# Patient Record
Sex: FEMALE | Race: WHITE
[De-identification: names, ages, dates, MRNs, and addresses within clinical notes are randomized per-mention and may not be internally consistent; named-entity substitution may affect disease eponyms.]

---

## 2017-05-17 ENCOUNTER — HOSPITAL ENCOUNTER (INPATIENT)
Dept: HOSPITAL 23 - P1E | Age: 27
LOS: 5 days | Discharge: HOME | DRG: 897 | End: 2017-05-22
Attending: SPECIALIST | Admitting: SPECIALIST
Payer: COMMERCIAL

## 2017-05-17 DIAGNOSIS — F10.20: Primary | ICD-10-CM

## 2017-05-17 DIAGNOSIS — B19.20: ICD-10-CM

## 2017-05-17 DIAGNOSIS — J45.909: ICD-10-CM

## 2017-05-18 LAB
BARBITURATES UR QL SCN: 0.6 MG/DL (ref 0.2–2)
BARBITURATES UR QL SCN: 3.7 G/DL (ref 3.5–5)
BASOPHIL#: 0 X10E3 (ref 0–0.3)
BASOPHIL%: 0.7 % (ref 0–2.5)
BENZODIAZ UR QL SCN: 120 U/L (ref 10–40)
BENZODIAZ UR QL SCN: 162 U/L (ref 10–42)
BLOOD UREA NITROGEN: 5 MG/DL (ref 9–23)
BUN/CREATININE RATIO: 7.14
BZE UR QL SCN: 127 U/L (ref 32–92)
CALCIUM SERUM: 8.9 MG/DL (ref 8.4–10.2)
CK MB SERPL-RTO: 17.4 % (ref 11–15.5)
CK MB SERPL-RTO: 32.5 G/DL (ref 30–36)
CREATININE SERUM: 0.7 MG/DL (ref 0.6–1.4)
DIFF IND: NO
EOSINOPHIL#: 0.1 X10E3 (ref 0–0.7)
EOSINOPHIL%: 2.1 % (ref 0–7)
GLOM FILT RATE ESTIMATED: 119.6 ML/MIN (ref 60–?)
GLUCOSE FASTING: 96 MG/DL (ref 70–110)
HEMATOCRIT: 35.2 % (ref 35–45)
HEMOGLOBIN: 11.4 GM/DL (ref 12–16)
KETONES UR QL: 28 MMOL/L (ref 22–31)
KETONES UR QL: 97 MMOL/L (ref 100–111)
LYMPHOCYTE#: 1.6 X10E3 (ref 1–3.5)
LYMPHOCYTE%: 28.6 % (ref 17–45)
MEAN CELL VOLUME: 89.6 FL (ref 83–96)
MEAN CORPUSCULAR HEMOGLOBIN: 29.1 PG (ref 28–34)
MEAN PLATELET VOLUME: 7.8 FL (ref 6.5–11.5)
MONOCYTE#: 0.4 X10E3 (ref 0–1)
MONOCYTE%: 6.5 % (ref 3–12)
NEUTROPHIL#: 3.5 X10E3 (ref 1.5–7.1)
NEUTROPHIL%: 62.1 % (ref 40–75)
PLATELET COUNT: 296 X10E3 (ref 140–420)
POTASSIUM: 3.4 MMOL/L (ref 3.5–5.1)
PROTEIN TOTAL SERUM: 6.7 G/DL (ref 6–8.3)
RED BLOOD COUNT: 3.92 X10E (ref 3.9–5.3)
SODIUM: 138 MMOL/L (ref 135–145)
WHITE BLOOD COUNT: 5.6 X10E3 (ref 4–10.5)

## 2017-05-18 PROCEDURE — HZ2ZZZZ DETOXIFICATION SERVICES FOR SUBSTANCE ABUSE TREATMENT: ICD-10-PCS | Performed by: SPECIALIST

## 2017-05-19 LAB
BARBITURATES: (no result)
BENZODIAZEPINES: (no result)
COCAINE: (no result)
DX ICD CODE: (no result)
DX ICD CODE: (no result)
OPIATES: (no result)
TRICYCLIC ANTIDEPRESSANTS: (no result)
U METHADONE: (no result)
URINE APPEARANCE: CLEAR
URINE BILIRUBIN: (no result)
URINE BLOOD: (no result)
URINE COLOR: (no result)
URINE GLUCOSE: (no result) MG/DL
URINE KETONE: (no result)
URINE LEUKOCYTE ESTERASE: (no result)
URINE NITRATE: (no result)
URINE PH: 8 (ref 5–8)
URINE PROTEIN: (no result)
URINE SOURCE: (no result)
URINE SPECIFIC GRAVITY: 1.02 (ref 1–1.03)
URINE UROBILINOGEN: 1 MG/DL

## 2017-05-24 LAB
HBSAG CONF (REFLEX-HEPPANEL): (no result)
HEP C AB SIGNAL TO CUTOFF: 28.4 RATIO (ref ?–1)

## 2019-05-09 ENCOUNTER — LAB REQUISITION (OUTPATIENT)
Dept: LAB | Facility: HOSPITAL | Age: 29
End: 2019-05-09

## 2019-05-09 DIAGNOSIS — Z00.00 ROUTINE GENERAL MEDICAL EXAMINATION AT A HEALTH CARE FACILITY: ICD-10-CM

## 2019-05-09 LAB
ALBUMIN SERPL-MCNC: 3.8 G/DL (ref 3.5–5.2)
ALBUMIN SERPL-MCNC: 3.8 G/DL (ref 3.5–5.2)
ALBUMIN/GLOB SERPL: 1.3 G/DL
ALP SERPL-CCNC: 91 U/L (ref 39–117)
ALP SERPL-CCNC: 91 U/L (ref 39–117)
ALT SERPL W P-5'-P-CCNC: 85 U/L (ref 1–33)
ALT SERPL W P-5'-P-CCNC: 85 U/L (ref 1–33)
AMYLASE SERPL-CCNC: 64 U/L (ref 28–100)
ANION GAP SERPL CALCULATED.3IONS-SCNC: 11 MMOL/L
AST SERPL-CCNC: 49 U/L (ref 1–32)
AST SERPL-CCNC: 49 U/L (ref 1–32)
BASOPHILS # BLD AUTO: 0.02 10*3/MM3 (ref 0–0.2)
BASOPHILS NFR BLD AUTO: 0.3 % (ref 0–1.5)
BILIRUB CONJ SERPL-MCNC: <0.2 MG/DL (ref 0.2–0.3)
BILIRUB INDIRECT SERPL-MCNC: ABNORMAL MG/DL
BILIRUB SERPL-MCNC: 0.2 MG/DL (ref 0.2–1.2)
BILIRUB SERPL-MCNC: 0.2 MG/DL (ref 0.2–1.2)
BUN BLD-MCNC: 11 MG/DL (ref 6–20)
BUN/CREAT SERPL: 17.2 (ref 7–25)
CALCIUM SPEC-SCNC: 9 MG/DL (ref 8.6–10.5)
CHLORIDE SERPL-SCNC: 99 MMOL/L (ref 98–107)
CHOLEST SERPL-MCNC: 165 MG/DL (ref 0–200)
CO2 SERPL-SCNC: 28 MMOL/L (ref 22–29)
CREAT BLD-MCNC: 0.64 MG/DL (ref 0.57–1)
DEPRECATED RDW RBC AUTO: 44.3 FL (ref 37–54)
EOSINOPHIL # BLD AUTO: 0.19 10*3/MM3 (ref 0–0.4)
EOSINOPHIL NFR BLD AUTO: 3 % (ref 0.3–6.2)
ERYTHROCYTE [DISTWIDTH] IN BLOOD BY AUTOMATED COUNT: 12.6 % (ref 12.3–15.4)
GFR SERPL CREATININE-BSD FRML MDRD: 110 ML/MIN/1.73
GFR SERPL CREATININE-BSD FRML MDRD: 134 ML/MIN/1.73
GLOBULIN UR ELPH-MCNC: 2.9 GM/DL
GLUCOSE BLD-MCNC: 123 MG/DL (ref 65–99)
HCT VFR BLD AUTO: 34.6 % (ref 34–46.6)
HDLC SERPL-MCNC: 44 MG/DL (ref 40–60)
HGB BLD-MCNC: 11.8 G/DL (ref 12–15.9)
IMM GRANULOCYTES # BLD AUTO: 0.01 10*3/MM3 (ref 0–0.05)
IMM GRANULOCYTES NFR BLD AUTO: 0.2 % (ref 0–0.5)
LDLC SERPL CALC-MCNC: 83 MG/DL (ref 0–100)
LDLC/HDLC SERPL: 1.89 {RATIO}
LIPASE SERPL-CCNC: 72 U/L (ref 13–60)
LYMPHOCYTES # BLD AUTO: 2.99 10*3/MM3 (ref 0.7–3.1)
LYMPHOCYTES NFR BLD AUTO: 47.5 % (ref 19.6–45.3)
MCH RBC QN AUTO: 32.4 PG (ref 26.6–33)
MCHC RBC AUTO-ENTMCNC: 34.1 G/DL (ref 31.5–35.7)
MCV RBC AUTO: 95.1 FL (ref 79–97)
MONOCYTES # BLD AUTO: 0.33 10*3/MM3 (ref 0.1–0.9)
MONOCYTES NFR BLD AUTO: 5.2 % (ref 5–12)
NEUTROPHILS # BLD AUTO: 2.76 10*3/MM3 (ref 1.7–7)
NEUTROPHILS NFR BLD AUTO: 43.8 % (ref 42.7–76)
PLATELET # BLD AUTO: 317 10*3/MM3 (ref 140–450)
PMV BLD AUTO: 9.1 FL (ref 6–12)
POTASSIUM BLD-SCNC: 4.1 MMOL/L (ref 3.5–5.2)
PROT SERPL-MCNC: 6.7 G/DL (ref 6–8.5)
PROT SERPL-MCNC: 6.7 G/DL (ref 6–8.5)
RBC # BLD AUTO: 3.64 10*6/MM3 (ref 3.77–5.28)
SODIUM BLD-SCNC: 138 MMOL/L (ref 136–145)
TRIGL SERPL-MCNC: 190 MG/DL (ref 0–150)
TSH SERPL DL<=0.05 MIU/L-ACNC: 1.52 MIU/ML (ref 0.27–4.2)
VLDLC SERPL-MCNC: 38 MG/DL
WBC NRBC COR # BLD: 6.3 10*3/MM3 (ref 3.4–10.8)

## 2019-05-09 PROCEDURE — 80076 HEPATIC FUNCTION PANEL: CPT

## 2019-05-09 PROCEDURE — 83690 ASSAY OF LIPASE: CPT

## 2019-05-09 PROCEDURE — 82150 ASSAY OF AMYLASE: CPT

## 2019-05-09 PROCEDURE — 85025 COMPLETE CBC W/AUTO DIFF WBC: CPT

## 2019-05-09 PROCEDURE — 84443 ASSAY THYROID STIM HORMONE: CPT

## 2019-05-09 PROCEDURE — 80061 LIPID PANEL: CPT

## 2019-05-09 PROCEDURE — 80053 COMPREHEN METABOLIC PANEL: CPT

## 2022-11-30 ENCOUNTER — APPOINTMENT (OUTPATIENT)
Dept: GENERAL RADIOLOGY | Facility: HOSPITAL | Age: 32
End: 2022-11-30

## 2022-11-30 ENCOUNTER — HOSPITAL ENCOUNTER (EMERGENCY)
Facility: HOSPITAL | Age: 32
Discharge: HOME | End: 2022-11-30
Attending: EMERGENCY MEDICINE | Admitting: EMERGENCY MEDICINE

## 2022-11-30 VITALS
DIASTOLIC BLOOD PRESSURE: 62 MMHG | TEMPERATURE: 97.7 F | SYSTOLIC BLOOD PRESSURE: 111 MMHG | RESPIRATION RATE: 18 BRPM | OXYGEN SATURATION: 99 % | HEART RATE: 63 BPM

## 2022-11-30 DIAGNOSIS — S63.257A CLOSED DISLOCATION OF LEFT LITTLE FINGER: Primary | ICD-10-CM

## 2022-11-30 PROCEDURE — 73140 X-RAY EXAM OF FINGER(S): CPT

## 2022-11-30 PROCEDURE — 99283 EMERGENCY DEPT VISIT LOW MDM: CPT

## 2022-11-30 RX ORDER — IBUPROFEN 800 MG/1
800 TABLET ORAL EVERY 8 HOURS PRN
Qty: 20 TABLET | Refills: 0 | OUTPATIENT
Start: 2022-11-30 | End: 2022-12-14

## 2022-11-30 RX ORDER — IBUPROFEN 400 MG/1
800 TABLET ORAL ONCE
Status: COMPLETED | OUTPATIENT
Start: 2022-11-30 | End: 2022-11-30

## 2022-11-30 RX ORDER — HYDROXYZINE PAMOATE 50 MG/1
CAPSULE ORAL
COMMUNITY

## 2022-11-30 RX ORDER — BUSPIRONE HYDROCHLORIDE 15 MG/1
15 TABLET ORAL 3 TIMES DAILY
COMMUNITY

## 2022-11-30 RX ADMIN — IBUPROFEN 800 MG: 400 TABLET, FILM COATED ORAL at 07:54

## 2022-12-14 ENCOUNTER — HOSPITAL ENCOUNTER (EMERGENCY)
Facility: HOSPITAL | Age: 32
Discharge: HOME | End: 2022-12-14
Attending: EMERGENCY MEDICINE | Admitting: EMERGENCY MEDICINE

## 2022-12-14 VITALS
OXYGEN SATURATION: 96 % | RESPIRATION RATE: 16 BRPM | BODY MASS INDEX: 28.6 KG/M2 | SYSTOLIC BLOOD PRESSURE: 101 MMHG | HEIGHT: 64 IN | WEIGHT: 167.55 LBS | HEART RATE: 72 BPM | TEMPERATURE: 98.3 F | DIASTOLIC BLOOD PRESSURE: 68 MMHG

## 2022-12-14 DIAGNOSIS — B34.9 VIRAL ILLNESS: ICD-10-CM

## 2022-12-14 DIAGNOSIS — M79.10 MYALGIA: ICD-10-CM

## 2022-12-14 DIAGNOSIS — J02.9 SORE THROAT: ICD-10-CM

## 2022-12-14 DIAGNOSIS — Z20.828 EXPOSURE TO THE FLU: Primary | ICD-10-CM

## 2022-12-14 LAB
FLUAV AG NPH QL: NEGATIVE
FLUBV AG NPH QL IA: NEGATIVE
S PYO AG THROAT QL: NEGATIVE
SARS-COV-2 RNA PNL SPEC NAA+PROBE: NOT DETECTED

## 2022-12-14 PROCEDURE — 87880 STREP A ASSAY W/OPTIC: CPT | Performed by: EMERGENCY MEDICINE

## 2022-12-14 PROCEDURE — U0004 COV-19 TEST NON-CDC HGH THRU: HCPCS | Performed by: NURSE PRACTITIONER

## 2022-12-14 PROCEDURE — 99283 EMERGENCY DEPT VISIT LOW MDM: CPT

## 2022-12-14 PROCEDURE — C9803 HOPD COVID-19 SPEC COLLECT: HCPCS | Performed by: NURSE PRACTITIONER

## 2022-12-14 PROCEDURE — 87804 INFLUENZA ASSAY W/OPTIC: CPT | Performed by: NURSE PRACTITIONER

## 2022-12-14 PROCEDURE — 87081 CULTURE SCREEN ONLY: CPT | Performed by: EMERGENCY MEDICINE

## 2022-12-14 RX ORDER — ACETAMINOPHEN 500 MG
1000 TABLET ORAL ONCE
Status: COMPLETED | OUTPATIENT
Start: 2022-12-14 | End: 2022-12-14

## 2022-12-14 RX ORDER — IBUPROFEN 800 MG/1
800 TABLET ORAL EVERY 8 HOURS PRN
Qty: 30 TABLET | Refills: 0 | Status: SHIPPED | OUTPATIENT
Start: 2022-12-14

## 2022-12-14 RX ORDER — ACETAMINOPHEN 500 MG
1000 TABLET ORAL EVERY 6 HOURS PRN
Qty: 30 TABLET | Refills: 0 | Status: SHIPPED | OUTPATIENT
Start: 2022-12-14

## 2022-12-14 RX ORDER — IBUPROFEN 600 MG/1
600 TABLET ORAL ONCE
Status: COMPLETED | OUTPATIENT
Start: 2022-12-14 | End: 2022-12-14

## 2022-12-14 RX ADMIN — IBUPROFEN 600 MG: 600 TABLET, FILM COATED ORAL at 09:46

## 2022-12-14 RX ADMIN — ACETAMINOPHEN 1000 MG: 500 TABLET ORAL at 09:46

## 2022-12-14 NOTE — DISCHARGE INSTRUCTIONS
Your flu and strep test were both negative today.  Your COVID test is pending.  I recommend isolating until you get your COVID test back.  You can check your MyChart for this.  Someone may call you with a positive result once received.  Alternate Tylenol and ibuprofen every 4 hours as needed for any pain or discomfort or fever.  Please drink plenty of fluids and stay well-hydrated.  Rest.  You can try gargling warm salt water for sore throat as well.  Follow-up with your family doctor in 2 to 3 days if you feel like you are not improving or return to the emergency department with any new or worsening symptoms.

## 2022-12-14 NOTE — ED PROVIDER NOTES
Subjective   History of Present Illness  This is a 32-year-old female who presents to the emergency department today with complaints of body aches, sore throat, chills, runny nose and dry cough.  She reports that she is currently at recovery center and has been exposed to other people who have the flu currently.  Symptoms began yesterday.  She denies any chest pain or shortness of breath.  She states she is not had any Tylenol or ibuprofen and states she does not have anywhere she is currently staying.  She denies any abdominal pain, nausea, vomiting or diarrhea.        Review of Systems   Constitutional: Positive for activity change, appetite change, chills, fatigue and fever.   HENT: Positive for postnasal drip, rhinorrhea and sore throat. Negative for congestion, ear pain, facial swelling and trouble swallowing.    Eyes: Negative.    Respiratory: Positive for cough. Negative for shortness of breath.    Cardiovascular: Negative for chest pain.   Gastrointestinal: Negative for abdominal pain, diarrhea, nausea and vomiting.   Endocrine: Negative.    Genitourinary: Negative for decreased urine volume, dysuria, flank pain and urgency.   Musculoskeletal: Positive for myalgias. Negative for arthralgias, neck pain and neck stiffness.   Skin: Negative for color change, rash and wound.   Allergic/Immunologic: Negative.    Neurological: Negative for dizziness, syncope, weakness, light-headedness and headaches.   Hematological: Negative.    Psychiatric/Behavioral: Negative.        Past Medical History:   Diagnosis Date   • Alcohol abuse    • Depression    • Substance abuse (HCC)        No Known Allergies    Past Surgical History:   Procedure Laterality Date   •  SECTION     • LEG SURGERY         History reviewed. No pertinent family history.    Social History     Socioeconomic History   • Marital status: Single   Tobacco Use   • Smoking status: Some Days     Types: Cigarettes   Substance and Sexual Activity   •  Alcohol use: Not Currently   • Drug use: Not Currently           Objective   Physical Exam  Constitutional:       General: She is not in acute distress.     Appearance: She is well-developed. She is ill-appearing. She is not toxic-appearing or diaphoretic.   HENT:      Head: Normocephalic and atraumatic.      Right Ear: Tympanic membrane normal. Tympanic membrane is not erythematous.      Left Ear: Tympanic membrane normal. Tympanic membrane is not erythematous.      Nose: Rhinorrhea present. No congestion.      Mouth/Throat:      Mouth: Mucous membranes are moist. No oral lesions.      Pharynx: Posterior oropharyngeal erythema present. No pharyngeal swelling, oropharyngeal exudate or uvula swelling.      Tonsils: No tonsillar exudate.   Eyes:      Conjunctiva/sclera: Conjunctivae normal.      Pupils: Pupils are equal, round, and reactive to light.   Cardiovascular:      Rate and Rhythm: Normal rate and regular rhythm.      Heart sounds: Normal heart sounds.   Pulmonary:      Effort: Pulmonary effort is normal. No respiratory distress.      Breath sounds: Normal breath sounds.   Abdominal:      General: Bowel sounds are normal.      Palpations: Abdomen is soft.      Tenderness: There is no abdominal tenderness.   Musculoskeletal:      Cervical back: Normal range of motion and neck supple.   Lymphadenopathy:      Cervical: No cervical adenopathy.   Skin:     General: Skin is warm and dry.      Capillary Refill: Capillary refill takes less than 2 seconds.   Neurological:      General: No focal deficit present.      Mental Status: She is alert and oriented to person, place, and time.   Psychiatric:         Mood and Affect: Mood normal.         Behavior: Behavior normal.         Procedures           ED Course                                           MDM  Number of Diagnoses or Management Options  Exposure to the flu  Myalgia  Sore throat  Viral illness  Diagnosis management comments:     Symptom Relief for Viral  Illnesses       1. DIAGNOSIS      Exposure to flu  Viral sore throat    You have been diagnosed with an illness caused by a virus.  Antibiotics do not work on viruses.  When antibiotics aren't needed, they won't help you, and the side effects could still hurt you.  The treatments prescribed below will help you feel better while your body fights off the virus.     2. GENERAL INSTRUCTIONS  Drink extra water and fluids  Use a cool mist vaporizer or saline nasal spray to relieve congestion  For sore throats in older children and adults, use ice chips, sore throat spray, or lozenges  Use honey to relieve cough.  Do not give honey to an infant younger than 1 year.       3. SPECIFIC MEDICINES   Use over-the-counter medications specific to your symptoms. Use medicines according to the package instructions or as directed by your healthcare professional.  Stop the medication when the symptoms get better.      4. FOLLOW UP  If not improved in 3-5 days, if new symptoms occur, or if you have other concerns, please call or return to the office for a recheck.        To learn more about antibiotic prescribing and use, visit www.cdc.gov/antibiotic-use    The patient presents to the ED with a cough. The patient is resting comfortably and feels better, is alert and in no distress.  On re-examination the patient does not appear toxic and has no meningeal signs (including a negative Kernig and Brudzinski sign), and there's no intractable vomiting, respiratory distress and no apparent pain. Based on the history, exam, diagnostic testing and reassessment, the patient has no signs of meningitis, significant pneumonia, pyelonephritis, sepsis or other acute serious bacterial infections, or other significant pathology to warrant further testing, continued ED treatment, admission or specialist evaluation. The patient's vital signs have been stable. The patient's condition is stable and is appropriate for discharge. The patient´s symptoms are  consistent with a viral upper respiratory infection and antibiotics are not indicated. The patient was counseled to return to the ED for re-evaluation for worsening cough, shortness of breath, uncontrollable headache, uncontrollable fever, altered mental status, or any symptoms which cause them concern. The patient will pursue further outpatient evaluation with the primary care physician or other designated or consultant physician as indicated in the discharge instructions.       Amount and/or Complexity of Data Reviewed  Clinical lab tests: ordered and reviewed  Tests in the medicine section of CPT®: ordered and reviewed  Review and summarize past medical records: yes    Risk of Complications, Morbidity, and/or Mortality  Presenting problems: moderate  Diagnostic procedures: low  Management options: moderate    Patient Progress  Patient progress: stable      Final diagnoses:   Exposure to the flu   Viral illness   Sore throat   Myalgia       ED Disposition  ED Disposition     ED Disposition   Discharge    Condition   Stable    Comment   --             Provider, No Known  [facility] [place] [address]    Schedule an appointment as soon as possible for a visit in 2 days  If symptoms worsen, As needed    [facility] EMERGENCY ROOM  [address] [facility] [id number] [phone]  Go to   As needed, If symptoms worsen         Medication List      New Prescriptions    acetaminophen 500 MG tablet  Commonly known as: TYLENOL  Take 2 tablets by mouth Every 6 (Six) Hours As Needed for Mild Pain.        Changed    ibuprofen 800 MG tablet  Commonly known as: ADVIL,MOTRIN  Take 1 tablet by mouth Every 8 (Eight) Hours As Needed for Mild Pain, Moderate Pain, Fever or Headache.  What changed: reasons to take this           Where to Get Your Medications      These medications were sent to [facility] - [address] - [phone] [facility] [phone] [facility]   [address], [place] [address]    Phone: [phone]   · acetaminophen 500 MG tablet  · ibuprofen 800 MG tablet          Megan Wilkinson, APRN  12/14/22 1020

## 2022-12-16 LAB — BACTERIA SPEC AEROBE CULT: NORMAL

## 2023-05-03 ENCOUNTER — APPOINTMENT (OUTPATIENT)
Dept: CT IMAGING | Facility: HOSPITAL | Age: 33
End: 2023-05-03
Payer: COMMERCIAL

## 2023-05-03 ENCOUNTER — HOSPITAL ENCOUNTER (EMERGENCY)
Facility: HOSPITAL | Age: 33
Discharge: HOME | End: 2023-05-04
Attending: EMERGENCY MEDICINE
Payer: COMMERCIAL

## 2023-05-03 ENCOUNTER — APPOINTMENT (OUTPATIENT)
Dept: GENERAL RADIOLOGY | Facility: HOSPITAL | Age: 33
End: 2023-05-03
Payer: COMMERCIAL

## 2023-05-03 VITALS
HEIGHT: 64 IN | HEART RATE: 73 BPM | SYSTOLIC BLOOD PRESSURE: 98 MMHG | WEIGHT: 137 LBS | DIASTOLIC BLOOD PRESSURE: 38 MMHG | OXYGEN SATURATION: 98 % | TEMPERATURE: 98 F | BODY MASS INDEX: 23.39 KG/M2 | RESPIRATION RATE: 16 BRPM

## 2023-05-03 DIAGNOSIS — K59.00 CONSTIPATION, UNSPECIFIED CONSTIPATION TYPE: Primary | ICD-10-CM

## 2023-05-03 DIAGNOSIS — R11.0 NAUSEA: ICD-10-CM

## 2023-05-03 DIAGNOSIS — R10.84 GENERALIZED ABDOMINAL PAIN: ICD-10-CM

## 2023-05-03 LAB
ALBUMIN SERPL-MCNC: 3.3 G/DL (ref 3.5–5.2)
ALBUMIN/GLOB SERPL: 1.3 G/DL
ALP SERPL-CCNC: 77 U/L (ref 39–117)
ALT SERPL W P-5'-P-CCNC: 113 U/L (ref 1–33)
ANION GAP SERPL CALCULATED.3IONS-SCNC: 5 MMOL/L (ref 5–15)
AST SERPL-CCNC: 121 U/L (ref 1–32)
B-HCG UR QL: NEGATIVE
BACTERIA UR QL AUTO: NORMAL /HPF
BASOPHILS # BLD AUTO: 0.02 10*3/MM3 (ref 0–0.2)
BASOPHILS NFR BLD AUTO: 0.5 % (ref 0–1.5)
BILIRUB SERPL-MCNC: <0.2 MG/DL (ref 0–1.2)
BILIRUB UR QL STRIP: NEGATIVE
BUN SERPL-MCNC: 9 MG/DL (ref 6–20)
BUN/CREAT SERPL: 10.1 (ref 7–25)
CALCIUM SPEC-SCNC: 8.6 MG/DL (ref 8.6–10.5)
CHLORIDE SERPL-SCNC: 105 MMOL/L (ref 98–107)
CLARITY UR: CLEAR
CO2 SERPL-SCNC: 28 MMOL/L (ref 22–29)
COLOR UR: YELLOW
CREAT SERPL-MCNC: 0.89 MG/DL (ref 0.57–1)
DEPRECATED RDW RBC AUTO: 42.5 FL (ref 37–54)
EGFRCR SERPLBLD CKD-EPI 2021: 88.5 ML/MIN/1.73
EOSINOPHIL # BLD AUTO: 0.29 10*3/MM3 (ref 0–0.4)
EOSINOPHIL NFR BLD AUTO: 7.1 % (ref 0.3–6.2)
ERYTHROCYTE [DISTWIDTH] IN BLOOD BY AUTOMATED COUNT: 12.2 % (ref 12.3–15.4)
GLOBULIN UR ELPH-MCNC: 2.5 GM/DL
GLUCOSE SERPL-MCNC: 110 MG/DL (ref 65–99)
GLUCOSE UR STRIP-MCNC: NEGATIVE MG/DL
HCT VFR BLD AUTO: 37.9 % (ref 34–46.6)
HGB BLD-MCNC: 12.2 G/DL (ref 12–15.9)
HGB UR QL STRIP.AUTO: NEGATIVE
HYALINE CASTS UR QL AUTO: NORMAL /LPF
IMM GRANULOCYTES # BLD AUTO: 0.01 10*3/MM3 (ref 0–0.05)
IMM GRANULOCYTES NFR BLD AUTO: 0.2 % (ref 0–0.5)
KETONES UR QL STRIP: NEGATIVE
LEUKOCYTE ESTERASE UR QL STRIP.AUTO: ABNORMAL
LIPASE SERPL-CCNC: 17 U/L (ref 13–60)
LYMPHOCYTES # BLD AUTO: 1.87 10*3/MM3 (ref 0.7–3.1)
LYMPHOCYTES NFR BLD AUTO: 45.8 % (ref 19.6–45.3)
MCH RBC QN AUTO: 30.3 PG (ref 26.6–33)
MCHC RBC AUTO-ENTMCNC: 32.2 G/DL (ref 31.5–35.7)
MCV RBC AUTO: 94 FL (ref 79–97)
MONOCYTES # BLD AUTO: 0.39 10*3/MM3 (ref 0.1–0.9)
MONOCYTES NFR BLD AUTO: 9.6 % (ref 5–12)
NEUTROPHILS NFR BLD AUTO: 1.5 10*3/MM3 (ref 1.7–7)
NEUTROPHILS NFR BLD AUTO: 36.8 % (ref 42.7–76)
NITRITE UR QL STRIP: NEGATIVE
NRBC BLD AUTO-RTO: 0 /100 WBC (ref 0–0.2)
PH UR STRIP.AUTO: 7 [PH] (ref 5–8)
PLATELET # BLD AUTO: 228 10*3/MM3 (ref 140–450)
PMV BLD AUTO: 9.4 FL (ref 6–12)
POTASSIUM SERPL-SCNC: 4.5 MMOL/L (ref 3.5–5.2)
PROT SERPL-MCNC: 5.8 G/DL (ref 6–8.5)
PROT UR QL STRIP: NEGATIVE
RBC # BLD AUTO: 4.03 10*6/MM3 (ref 3.77–5.28)
RBC # UR STRIP: NORMAL /HPF
REF LAB TEST METHOD: NORMAL
SODIUM SERPL-SCNC: 138 MMOL/L (ref 136–145)
SP GR UR STRIP: 1.01 (ref 1–1.03)
SQUAMOUS #/AREA URNS HPF: NORMAL /HPF
UROBILINOGEN UR QL STRIP: ABNORMAL
WBC # UR STRIP: NORMAL /HPF
WBC NRBC COR # BLD: 4.08 10*3/MM3 (ref 3.4–10.8)

## 2023-05-03 PROCEDURE — 85025 COMPLETE CBC W/AUTO DIFF WBC: CPT | Performed by: EMERGENCY MEDICINE

## 2023-05-03 PROCEDURE — 87591 N.GONORRHOEAE DNA AMP PROB: CPT | Performed by: EMERGENCY MEDICINE

## 2023-05-03 PROCEDURE — 71045 X-RAY EXAM CHEST 1 VIEW: CPT

## 2023-05-03 PROCEDURE — 74176 CT ABD & PELVIS W/O CONTRAST: CPT

## 2023-05-03 PROCEDURE — 80053 COMPREHEN METABOLIC PANEL: CPT | Performed by: EMERGENCY MEDICINE

## 2023-05-03 PROCEDURE — 93005 ELECTROCARDIOGRAM TRACING: CPT | Performed by: EMERGENCY MEDICINE

## 2023-05-03 PROCEDURE — 99284 EMERGENCY DEPT VISIT MOD MDM: CPT

## 2023-05-03 PROCEDURE — 83690 ASSAY OF LIPASE: CPT | Performed by: EMERGENCY MEDICINE

## 2023-05-03 PROCEDURE — 36415 COLL VENOUS BLD VENIPUNCTURE: CPT

## 2023-05-03 PROCEDURE — 81025 URINE PREGNANCY TEST: CPT | Performed by: EMERGENCY MEDICINE

## 2023-05-03 PROCEDURE — 87491 CHLMYD TRACH DNA AMP PROBE: CPT | Performed by: EMERGENCY MEDICINE

## 2023-05-03 PROCEDURE — 81001 URINALYSIS AUTO W/SCOPE: CPT | Performed by: EMERGENCY MEDICINE

## 2023-05-03 RX ORDER — ONDANSETRON 2 MG/ML
4 INJECTION INTRAMUSCULAR; INTRAVENOUS ONCE
Status: COMPLETED | OUTPATIENT
Start: 2023-05-03 | End: 2023-05-04

## 2023-05-03 RX ORDER — MAGNESIUM CARB/ALUMINUM HYDROX 105-160MG
296 TABLET,CHEWABLE ORAL ONCE
Status: DISCONTINUED | OUTPATIENT
Start: 2023-05-03 | End: 2023-05-03 | Stop reason: RX

## 2023-05-03 RX ORDER — POLYETHYLENE GLYCOL 3350 17 G/17G
17 POWDER, FOR SOLUTION ORAL DAILY
Qty: 10 EACH | Refills: 0 | Status: ON HOLD | OUTPATIENT
Start: 2023-05-03 | End: 2023-05-11

## 2023-05-03 RX ORDER — AMOXICILLIN 250 MG
2 CAPSULE ORAL NIGHTLY PRN
Qty: 60 TABLET | Refills: 0 | Status: ON HOLD | OUTPATIENT
Start: 2023-05-03 | End: 2023-05-11

## 2023-05-03 RX ORDER — ONDANSETRON 4 MG/1
4 TABLET, ORALLY DISINTEGRATING ORAL EVERY 6 HOURS PRN
Qty: 12 TABLET | Refills: 0 | Status: ON HOLD | OUTPATIENT
Start: 2023-05-03 | End: 2023-05-11

## 2023-05-04 PROCEDURE — 25010000002 ONDANSETRON PER 1 MG: Performed by: EMERGENCY MEDICINE

## 2023-05-04 PROCEDURE — 96374 THER/PROPH/DIAG INJ IV PUSH: CPT

## 2023-05-04 RX ORDER — MAGNESIUM HYDROXIDE 1200 MG/15ML
1 LIQUID ORAL
Qty: 4 ENEMA | Refills: 0 | Status: ON HOLD | OUTPATIENT
Start: 2023-05-04 | End: 2023-05-11

## 2023-05-04 RX ADMIN — MAGNESIUM HYDROXIDE 30 ML: 400 SUSPENSION ORAL at 00:16

## 2023-05-04 RX ADMIN — ONDANSETRON 4 MG: 2 INJECTION INTRAMUSCULAR; INTRAVENOUS at 00:16

## 2023-05-04 NOTE — ED PROVIDER NOTES
Subjective   History of Present Illness  32-year-old female is brought by EMS from the [facility] where she is undergoing treatment for alcoholism.  She is brought for evaluation of a low blood pressure prior to arrival.  The patient reports that the lowest blood pressure prior to arrival was in the 70s systolic over 50s diastolic.  The patient also complains of generalized abdominal pain and constipation with last bowel movement approximately 6 days ago.  She has some associated nausea but denies recent vomiting.  She has a similar prior episode of severe constipation with no bowel movement for 12 days in the past which required significant laxative use to resolved.  Patient reports she has to strain to urinate and has some generalized weakness and low back pain as well.  Patient reports history of hepatitis C, and requests STD testing.  She denies acute urinary symptoms such as dysuria or urinary incontinence recently.  She reports a history of pancreatitis.            Past Medical History:   Diagnosis Date   • Alcohol abuse    • Depression    • Substance abuse    Hepatitis C, pancreatitis    No Known Allergies    Past Surgical History:   Procedure Laterality Date   •  SECTION     • LEG SURGERY         History reviewed. No pertinent family history.    Social History     Socioeconomic History   • Marital status: Single   Tobacco Use   • Smoking status: Some Days     Types: Cigarettes   Substance and Sexual Activity   • Alcohol use: Not Currently   • Drug use: Not Currently     History of alcoholism.  Patient reports history of IV drug abuse many years ago.  She denies any recent IV drug use.      Objective   Physical Exam  Vitals and nursing note reviewed.   Constitutional:       General: She is not in acute distress.     Appearance: She is not diaphoretic.   Eyes:      General: No scleral icterus.     Comments: No photophobia.   Neck:      Comments: No meningismus.  Cardiovascular:      Rate and Rhythm: Normal  rate and regular rhythm.      Heart sounds: No murmur heard.    No friction rub. No gallop.      Comments: S1, S2.  Pulmonary:      Effort: Pulmonary effort is normal.      Breath sounds: Normal breath sounds. No stridor. No wheezing, rhonchi or rales.   Abdominal:      Palpations: Abdomen is soft.      Tenderness: There is no right CVA tenderness or left CVA tenderness.      Comments: Mild distention, mild diffuse tenderness to palpation.  No guarding or rebound.   Musculoskeletal:         General: No deformity.      Cervical back: Neck supple.      Comments: No significant peripheral edema.   Skin:     General: Skin is warm and dry.      Coloration: Skin is not cyanotic.      Comments: No diaphoresis.   Neurological:      Comments: Normal speech, no dysarthria.  Moves all extremities.         Procedures           ED Course  ED Course as of 05/04/23 0011   Wed May 03, 2023   2352 Prior to discharge, results and plan were discussed with the patient.  Patient is going back to the [facility] for further inpatient detox and treatment.  Prescriptions were printed out and given to staff member of the [facility].  All questions were answered prior to discharge.  Her mild elevation in liver function test is likely secondary to her history of known hepatitis C. [LD]      ED Course User Index  [LD] Kimberly Isbell MD                                           Medical Decision Making  Differential diagnosis includes constipation, IBS with constipation, less likely bowel obstruction or urinary tract infection.  Differential diagnosis includes electrolyte abnormality.    Constipation, unspecified constipation type: complicated acute illness or injury  Generalized abdominal pain: complicated acute illness or injury  Nausea: complicated acute illness or injury  Amount and/or Complexity of Data Reviewed  Labs: ordered. Decision-making details documented in ED Course.  Radiology: ordered. Decision-making details documented in ED  Course.  ECG/medicine tests: ordered and independent interpretation performed. Decision-making details documented in ED Course.     Details: EKG at 2001 shows normal sinus rhythm at a rate of 85 bpm, normal intervals, no acute ischemic changes.      Risk  OTC drugs.  Prescription drug management.        Recent Results (from the past 24 hour(s))   Urinalysis With Microscopic If Indicated (No Culture) - Urine, Clean Catch    Collection Time: 05/03/23  7:57 PM    Specimen: Urine, Clean Catch   Result Value Ref Range    Color, UA Yellow Yellow, Straw    Appearance, UA Clear Clear    pH, UA 7.0 5.0 - 8.0    Specific Gravity, UA 1.008 1.001 - 1.030    Glucose, UA Negative Negative    Ketones, UA Negative Negative    Bilirubin, UA Negative Negative    Blood, UA Negative Negative    Protein, UA Negative Negative    Leuk Esterase, UA Trace (A) Negative    Nitrite, UA Negative Negative    Urobilinogen, UA 0.2 E.U./dL 0.2 - 1.0 E.U./dL   Pregnancy, Urine - Urine, Clean Catch    Collection Time: 05/03/23  7:57 PM    Specimen: Urine, Clean Catch   Result Value Ref Range    HCG, Urine QL Negative Negative   Urinalysis, Microscopic Only - Urine, Clean Catch    Collection Time: 05/03/23  7:57 PM    Specimen: Urine, Clean Catch   Result Value Ref Range    RBC, UA None Seen None Seen, 0-2 /HPF    WBC, UA 0-2 None Seen, 0-2 /HPF    Bacteria, UA Trace None Seen, Trace /HPF    Squamous Epithelial Cells, UA 0-2 None Seen, 0-2 /HPF    Hyaline Casts, UA None Seen 0 - 6 /LPF    Methodology Automated Microscopy    ECG 12 Lead Altered Mental Status    Collection Time: 05/03/23  8:01 PM   Result Value Ref Range    QT Interval 384 ms    QTC Interval 456 ms   Comprehensive Metabolic Panel    Collection Time: 05/03/23  8:07 PM    Specimen: Blood   Result Value Ref Range    Glucose 110 (H) 65 - 99 mg/dL    BUN 9 6 - 20 mg/dL    Creatinine 0.89 0.57 - 1.00 mg/dL    Sodium 138 136 - 145 mmol/L    Potassium 4.5 3.5 - 5.2 mmol/L    Chloride 105 98 -  107 mmol/L    CO2 28.0 22.0 - 29.0 mmol/L    Calcium 8.6 8.6 - 10.5 mg/dL    Total Protein 5.8 (L) 6.0 - 8.5 g/dL    Albumin 3.3 (L) 3.5 - 5.2 g/dL    ALT (SGPT) 113 (H) 1 - 33 U/L    AST (SGOT) 121 (H) 1 - 32 U/L    Alkaline Phosphatase 77 39 - 117 U/L    Total Bilirubin <0.2 0.0 - 1.2 mg/dL    Globulin 2.5 gm/dL    A/G Ratio 1.3 g/dL    BUN/Creatinine Ratio 10.1 7.0 - 25.0    Anion Gap 5.0 5.0 - 15.0 mmol/L    eGFR 88.5 >60.0 mL/min/1.73   CBC Auto Differential    Collection Time: 05/03/23  8:07 PM    Specimen: Blood   Result Value Ref Range    WBC 4.08 3.40 - 10.80 10*3/mm3    RBC 4.03 3.77 - 5.28 10*6/mm3    Hemoglobin 12.2 12.0 - 15.9 g/dL    Hematocrit 37.9 34.0 - 46.6 %    MCV 94.0 79.0 - 97.0 fL    MCH 30.3 26.6 - 33.0 pg    MCHC 32.2 31.5 - 35.7 g/dL    RDW 12.2 (L) 12.3 - 15.4 %    RDW-SD 42.5 37.0 - 54.0 fl    MPV 9.4 6.0 - 12.0 fL    Platelets 228 140 - 450 10*3/mm3    Neutrophil % 36.8 (L) 42.7 - 76.0 %    Lymphocyte % 45.8 (H) 19.6 - 45.3 %    Monocyte % 9.6 5.0 - 12.0 %    Eosinophil % 7.1 (H) 0.3 - 6.2 %    Basophil % 0.5 0.0 - 1.5 %    Immature Grans % 0.2 0.0 - 0.5 %    Neutrophils, Absolute 1.50 (L) 1.70 - 7.00 10*3/mm3    Lymphocytes, Absolute 1.87 0.70 - 3.10 10*3/mm3    Monocytes, Absolute 0.39 0.10 - 0.90 10*3/mm3    Eosinophils, Absolute 0.29 0.00 - 0.40 10*3/mm3    Basophils, Absolute 0.02 0.00 - 0.20 10*3/mm3    Immature Grans, Absolute 0.01 0.00 - 0.05 10*3/mm3    nRBC 0.0 0.0 - 0.2 /100 WBC   Lipase    Collection Time: 05/03/23  8:07 PM    Specimen: Blood   Result Value Ref Range    Lipase 17 13 - 60 U/L     Note: In addition to lab results from this visit, the labs listed above may include labs taken at another facility or during a different encounter within the last 24 hours. Please correlate lab times with ED admission and discharge times for further clarification of the services performed during this visit.    CT Abdomen Pelvis Without Contrast   Final Result   Impression:      1.  "Marked fecal stasis, but with no evidence of inflammation or other associated abnormality except for redundant descending colon.      2. Significantly distended stomach, perhaps due to a large recent meal. Delayed gastric emptying is a consideration. No evidence of small bowel dilatation or obstruction.      3. No evidence of urolithiasis, obstructive uropathy, or perinephric inflammatory change. No other evidence of acute intra-abdominal or intrapelvic disease.                  Electronically Signed: Zeke Parikhd     5/3/2023 10:41 PM EDT     Workstation ID: [id number]      XR Chest 1 View   Final Result   No acute cardiopulmonary abnormality.               Electronically Signed: Paz Carlton     5/3/2023 8:24 PM EDT     Workstation ID: [id number]        Vitals:    05/03/23 1845 05/03/23 2130 05/03/23 2257   BP: 112/70  (!) 98/38   Patient Position: Lying     Pulse: 81 74 73   Resp: 16     Temp: 98 °F (36.7 °C)     TempSrc: Oral     SpO2: 97% 94% 98%   Weight: 62.1 kg (137 lb)     Height: 162.6 cm (64\")       Medications   ondansetron (ZOFRAN) injection 4 mg (has no administration in time range)   magnesium hydroxide (MILK OF MAGNESIA) 400 MG/5ML suspension 30 mL (has no administration in time range)     ECG/EMG Results (last 24 hours)     Procedure Component Value Units Date/Time    ECG 12 Lead Altered Mental Status [[id number]] Collected: 05/03/23 2001     Updated: 05/03/23 2001     QT Interval 384 ms      QTC Interval 456 ms     Narrative:      Test Reason : Altered Mental Status  Blood Pressure :   */*   mmHG  Vent. Rate :  85 BPM     Atrial Rate :  85 BPM     P-R Int : 174 ms          QRS Dur :  82 ms      QT Int : 384 ms       P-R-T Axes :  44  13  49 degrees     QTc Int : 456 ms    ** Poor data quality, interpretation may be adversely affected  Normal sinus rhythm  Normal ECG  No previous ECGs available    Referred By: ED MD           Confirmed By:         ECG 12 Lead Altered Mental Status   Preliminary Result   Test " Reason : Altered Mental Status   Blood Pressure :   */*   mmHG   Vent. Rate :  85 BPM     Atrial Rate :  85 BPM      P-R Int : 174 ms          QRS Dur :  82 ms       QT Int : 384 ms       P-R-T Axes :  44  13  49 degrees      QTc Int : 456 ms      ** Poor data quality, interpretation may be adversely affected   Normal sinus rhythm   Normal ECG   No previous ECGs available      Referred By: ED MD           Confirmed By:               Final diagnoses:   Constipation, unspecified constipation type   Generalized abdominal pain   Nausea       ED Disposition  ED Disposition     ED Disposition   Discharge    Condition   Stable    Comment   --             PATIENT CONNECTION - [place] [address]  [phone]  Schedule an appointment as soon as possible for a visit in 1 week  Call to establish care with a primary care provider.    Erasmo Thomas MD  [address] [place] [address]  [phone]    Schedule an appointment as soon as possible for a visit in 1 week  This is a gastroenterologist or you can follow up with your gastroenterologist of choice for further evaluation and treatment of your constipation. There are several other maintainance treatments you may benefit from to keep this from happening again.         Medication List      New Prescriptions    magnesium hydroxide 400 MG/5ML suspension  Commonly known as: Milk of Magnesia  Take 30 mL by mouth Daily As Needed for Constipation.     ondansetron ODT 4 MG disintegrating tablet  Commonly known as: ZOFRAN-ODT  Place 1 tablet under the tongue Every 6 (Six) Hours As Needed for Nausea or Vomiting.     polyethylene glycol 17 g packet  Commonly known as: MIRALAX  Take 17 g by mouth Daily for 10 days.     sennosides-docusate 8.6-50 MG per tablet  Commonly known as: Senna S  Take 2 tablets by mouth At Night As Needed for Constipation for up to 30 days. PRN constipation     sodium phosphate 7-19 GM/118ML enema  Insert 1 enema into  the rectum Every Other Day As Needed (constipation).           Where to Get Your Medications      You can get these medications from any pharmacy    Bring a paper prescription for each of these medications  · magnesium hydroxide 400 MG/5ML suspension  · ondansetron ODT 4 MG disintegrating tablet  · polyethylene glycol 17 g packet  · sennosides-docusate 8.6-50 MG per tablet  · sodium phosphate 7-19 GM/118ML enema          Kimberly Isbell MD  05/03/23 4650       Kimberly Isbell MD  05/04/23 0011

## 2023-05-05 LAB
C TRACH RRNA SPEC QL NAA+PROBE: NEGATIVE
N GONORRHOEA RRNA SPEC QL NAA+PROBE: NEGATIVE

## 2023-05-07 LAB
QT INTERVAL: 384 MS
QTC INTERVAL: 456 MS

## 2023-05-11 ENCOUNTER — PREP FOR SURGERY (OUTPATIENT)
Dept: OTHER | Facility: HOSPITAL | Age: 33
End: 2023-05-11
Payer: COMMERCIAL

## 2023-05-11 ENCOUNTER — HOSPITAL ENCOUNTER (INPATIENT)
Facility: HOSPITAL | Age: 33
LOS: 6 days | Discharge: HOME | DRG: 885 | End: 2023-05-17
Attending: PSYCHIATRY & NEUROLOGY | Admitting: PSYCHIATRY & NEUROLOGY
Payer: COMMERCIAL

## 2023-05-11 ENCOUNTER — HOSPITAL ENCOUNTER (EMERGENCY)
Facility: HOSPITAL | Age: 33
Discharge: PSYCHIATRIC OR SUBSTANCE USE FACILITY | DRG: 885 | End: 2023-05-11
Attending: EMERGENCY MEDICINE
Payer: COMMERCIAL

## 2023-05-11 VITALS
HEIGHT: 64 IN | DIASTOLIC BLOOD PRESSURE: 54 MMHG | TEMPERATURE: 97.5 F | BODY MASS INDEX: 23.67 KG/M2 | RESPIRATION RATE: 20 BRPM | OXYGEN SATURATION: 99 % | HEART RATE: 118 BPM | SYSTOLIC BLOOD PRESSURE: 108 MMHG | WEIGHT: 138.67 LBS

## 2023-05-11 DIAGNOSIS — F29 PSYCHOSIS: Primary | ICD-10-CM

## 2023-05-11 DIAGNOSIS — F29 PSYCHOSIS: ICD-10-CM

## 2023-05-11 DIAGNOSIS — F99 PSYCHIATRIC DISORDER: ICD-10-CM

## 2023-05-11 DIAGNOSIS — F19.10 SUBSTANCE ABUSE: ICD-10-CM

## 2023-05-11 DIAGNOSIS — F99 PSYCHIATRIC COMPLAINT: Primary | ICD-10-CM

## 2023-05-11 LAB
ALBUMIN SERPL-MCNC: 4.3 G/DL (ref 3.5–5.2)
ALBUMIN/GLOB SERPL: 1.3 G/DL
ALP SERPL-CCNC: 86 U/L (ref 39–117)
ALT SERPL W P-5'-P-CCNC: 65 U/L (ref 1–33)
AMPHET+METHAMPHET UR QL: POSITIVE
ANION GAP SERPL CALCULATED.3IONS-SCNC: 12.5 MMOL/L (ref 5–15)
APAP SERPL-MCNC: <5 MCG/ML (ref 0–30)
AST SERPL-CCNC: 57 U/L (ref 1–32)
BACTERIA UR QL AUTO: ABNORMAL /HPF
BARBITURATES UR QL SCN: NEGATIVE
BASOPHILS # BLD AUTO: 0.03 10*3/MM3 (ref 0–0.2)
BASOPHILS NFR BLD AUTO: 0.4 % (ref 0–1.5)
BENZODIAZ UR QL SCN: POSITIVE
BILIRUB SERPL-MCNC: 0.5 MG/DL (ref 0–1.2)
BILIRUB UR QL STRIP: NEGATIVE
BUN SERPL-MCNC: 17 MG/DL (ref 6–20)
BUN/CREAT SERPL: 18.3 (ref 7–25)
CALCIUM SPEC-SCNC: 9.4 MG/DL (ref 8.6–10.5)
CANNABINOIDS SERPL QL: POSITIVE
CHLORIDE SERPL-SCNC: 96 MMOL/L (ref 98–107)
CLARITY UR: CLEAR
CO2 SERPL-SCNC: 28.5 MMOL/L (ref 22–29)
COCAINE UR QL: NEGATIVE
COLOR UR: ABNORMAL
CREAT SERPL-MCNC: 0.93 MG/DL (ref 0.57–1)
DEPRECATED RDW RBC AUTO: 39.7 FL (ref 37–54)
EGFRCR SERPLBLD CKD-EPI 2021: 83.9 ML/MIN/1.73
EOSINOPHIL # BLD AUTO: 0.65 10*3/MM3 (ref 0–0.4)
EOSINOPHIL NFR BLD AUTO: 8.9 % (ref 0.3–6.2)
ERYTHROCYTE [DISTWIDTH] IN BLOOD BY AUTOMATED COUNT: 12.2 % (ref 12.3–15.4)
ETHANOL BLD-MCNC: <10 MG/DL (ref 0–10)
ETHANOL UR QL: <0.01 %
FENTANYL UR-MCNC: NEGATIVE NG/ML
GLOBULIN UR ELPH-MCNC: 3.2 GM/DL
GLUCOSE SERPL-MCNC: 124 MG/DL (ref 65–99)
GLUCOSE UR STRIP-MCNC: NEGATIVE MG/DL
HCT VFR BLD AUTO: 41.1 % (ref 34–46.6)
HGB BLD-MCNC: 13.9 G/DL (ref 12–15.9)
HGB UR QL STRIP.AUTO: NEGATIVE
HOLD SPECIMEN: NORMAL
HOLD SPECIMEN: NORMAL
HYALINE CASTS UR QL AUTO: ABNORMAL /LPF
IMM GRANULOCYTES # BLD AUTO: 0.01 10*3/MM3 (ref 0–0.05)
IMM GRANULOCYTES NFR BLD AUTO: 0.1 % (ref 0–0.5)
KETONES UR QL STRIP: NEGATIVE
LEUKOCYTE ESTERASE UR QL STRIP.AUTO: ABNORMAL
LYMPHOCYTES # BLD AUTO: 2.43 10*3/MM3 (ref 0.7–3.1)
LYMPHOCYTES NFR BLD AUTO: 33.2 % (ref 19.6–45.3)
MCH RBC QN AUTO: 30.3 PG (ref 26.6–33)
MCHC RBC AUTO-ENTMCNC: 33.8 G/DL (ref 31.5–35.7)
MCV RBC AUTO: 89.7 FL (ref 79–97)
METHADONE UR QL SCN: NEGATIVE
MONOCYTES # BLD AUTO: 0.57 10*3/MM3 (ref 0.1–0.9)
MONOCYTES NFR BLD AUTO: 7.8 % (ref 5–12)
NEUTROPHILS NFR BLD AUTO: 3.63 10*3/MM3 (ref 1.7–7)
NEUTROPHILS NFR BLD AUTO: 49.6 % (ref 42.7–76)
NITRITE UR QL STRIP: NEGATIVE
NRBC BLD AUTO-RTO: 0 /100 WBC (ref 0–0.2)
OPIATES UR QL: NEGATIVE
OXYCODONE UR QL SCN: NEGATIVE
PH UR STRIP.AUTO: 6 [PH] (ref 5–8)
PLATELET # BLD AUTO: 371 10*3/MM3 (ref 140–450)
PMV BLD AUTO: 8.9 FL (ref 6–12)
POTASSIUM SERPL-SCNC: 3.1 MMOL/L (ref 3.5–5.2)
PROT SERPL-MCNC: 7.5 G/DL (ref 6–8.5)
PROT UR QL STRIP: NEGATIVE
RBC # BLD AUTO: 4.58 10*6/MM3 (ref 3.77–5.28)
RBC # UR STRIP: ABNORMAL /HPF
REF LAB TEST METHOD: ABNORMAL
SALICYLATES SERPL-MCNC: <0.3 MG/DL
SODIUM SERPL-SCNC: 137 MMOL/L (ref 136–145)
SP GR UR STRIP: 1.03 (ref 1–1.03)
SQUAMOUS #/AREA URNS HPF: ABNORMAL /HPF
UROBILINOGEN UR QL STRIP: ABNORMAL
WBC # UR STRIP: ABNORMAL /HPF
WBC NRBC COR # BLD: 7.32 10*3/MM3 (ref 3.4–10.8)
WHOLE BLOOD HOLD COAG: NORMAL
WHOLE BLOOD HOLD SPECIMEN: NORMAL

## 2023-05-11 PROCEDURE — 80307 DRUG TEST PRSMV CHEM ANLYZR: CPT | Performed by: EMERGENCY MEDICINE

## 2023-05-11 PROCEDURE — 82077 ASSAY SPEC XCP UR&BREATH IA: CPT

## 2023-05-11 PROCEDURE — 85025 COMPLETE CBC W/AUTO DIFF WBC: CPT

## 2023-05-11 PROCEDURE — 81001 URINALYSIS AUTO W/SCOPE: CPT

## 2023-05-11 PROCEDURE — 80053 COMPREHEN METABOLIC PANEL: CPT

## 2023-05-11 PROCEDURE — 99283 EMERGENCY DEPT VISIT LOW MDM: CPT

## 2023-05-11 PROCEDURE — 80143 DRUG ASSAY ACETAMINOPHEN: CPT

## 2023-05-11 PROCEDURE — 80179 DRUG ASSAY SALICYLATE: CPT

## 2023-05-11 PROCEDURE — 36415 COLL VENOUS BLD VENIPUNCTURE: CPT

## 2023-05-11 RX ORDER — LOPERAMIDE HYDROCHLORIDE 2 MG/1
2 CAPSULE ORAL
Status: CANCELLED | OUTPATIENT
Start: 2023-05-11

## 2023-05-11 RX ORDER — DIPHENHYDRAMINE HCL 50 MG
50 CAPSULE ORAL EVERY 4 HOURS PRN
Status: CANCELLED | OUTPATIENT
Start: 2023-05-11

## 2023-05-11 RX ORDER — DULOXETIN HYDROCHLORIDE 30 MG/1
1 CAPSULE, DELAYED RELEASE ORAL DAILY
Status: ON HOLD | COMMUNITY
Start: 2023-04-26 | End: 2023-05-17 | Stop reason: SDUPTHER

## 2023-05-11 RX ORDER — LORAZEPAM 2 MG/1
2 TABLET ORAL EVERY 4 HOURS PRN
Status: DISCONTINUED | OUTPATIENT
Start: 2023-05-11 | End: 2023-05-17 | Stop reason: HOSPADM

## 2023-05-11 RX ORDER — CYCLOBENZAPRINE HCL 10 MG
1 TABLET ORAL EVERY 12 HOURS SCHEDULED
COMMUNITY
Start: 2023-04-26 | End: 2023-05-17 | Stop reason: HOSPADM

## 2023-05-11 RX ORDER — HALOPERIDOL 5 MG/1
5 TABLET ORAL EVERY 4 HOURS PRN
Status: CANCELLED | OUTPATIENT
Start: 2023-05-11

## 2023-05-11 RX ORDER — OLANZAPINE 10 MG/1
20 TABLET ORAL ONCE
Status: COMPLETED | OUTPATIENT
Start: 2023-05-11 | End: 2023-05-11

## 2023-05-11 RX ORDER — NICOTINE 21 MG/24HR
1 PATCH, TRANSDERMAL 24 HOURS TRANSDERMAL DAILY PRN
Status: CANCELLED | OUTPATIENT
Start: 2023-05-11

## 2023-05-11 RX ORDER — BUPRENORPHINE AND NALOXONE 8; 2 MG/1; MG/1
2 FILM, SOLUBLE BUCCAL; SUBLINGUAL DAILY
COMMUNITY
Start: 2023-05-05

## 2023-05-11 RX ORDER — PROMETHAZINE HYDROCHLORIDE 25 MG/1
1 TABLET ORAL EVERY 6 HOURS PRN
COMMUNITY
Start: 2023-03-24 | End: 2023-05-17 | Stop reason: HOSPADM

## 2023-05-11 RX ORDER — TRAZODONE HYDROCHLORIDE 50 MG/1
100 TABLET ORAL NIGHTLY PRN
Status: DISCONTINUED | OUTPATIENT
Start: 2023-05-11 | End: 2023-05-17 | Stop reason: HOSPADM

## 2023-05-11 RX ORDER — DIPHENHYDRAMINE HYDROCHLORIDE 50 MG/ML
50 INJECTION INTRAMUSCULAR; INTRAVENOUS EVERY 4 HOURS PRN
Status: DISCONTINUED | OUTPATIENT
Start: 2023-05-11 | End: 2023-05-17 | Stop reason: HOSPADM

## 2023-05-11 RX ORDER — ACETAMINOPHEN 325 MG/1
650 TABLET ORAL EVERY 4 HOURS PRN
Status: CANCELLED | OUTPATIENT
Start: 2023-05-11

## 2023-05-11 RX ORDER — LORAZEPAM 2 MG/ML
2 INJECTION INTRAMUSCULAR EVERY 4 HOURS PRN
Status: CANCELLED | OUTPATIENT
Start: 2023-05-11 | End: 2023-05-18

## 2023-05-11 RX ORDER — DIPHENHYDRAMINE HYDROCHLORIDE 50 MG/ML
50 INJECTION INTRAMUSCULAR; INTRAVENOUS EVERY 4 HOURS PRN
Status: CANCELLED | OUTPATIENT
Start: 2023-05-11

## 2023-05-11 RX ORDER — HALOPERIDOL 5 MG/1
5 TABLET ORAL EVERY 4 HOURS PRN
Status: DISCONTINUED | OUTPATIENT
Start: 2023-05-11 | End: 2023-05-17 | Stop reason: HOSPADM

## 2023-05-11 RX ORDER — OLANZAPINE 10 MG/1
20 TABLET ORAL ONCE
Status: CANCELLED | OUTPATIENT
Start: 2023-05-11

## 2023-05-11 RX ORDER — LOPERAMIDE HYDROCHLORIDE 2 MG/1
2 CAPSULE ORAL
Status: DISCONTINUED | OUTPATIENT
Start: 2023-05-11 | End: 2023-05-17 | Stop reason: HOSPADM

## 2023-05-11 RX ORDER — LORAZEPAM 2 MG/1
2 TABLET ORAL EVERY 4 HOURS PRN
Status: CANCELLED | OUTPATIENT
Start: 2023-05-11 | End: 2023-05-18

## 2023-05-11 RX ORDER — LORAZEPAM 2 MG/ML
2 INJECTION INTRAMUSCULAR EVERY 4 HOURS PRN
Status: DISCONTINUED | OUTPATIENT
Start: 2023-05-11 | End: 2023-05-17 | Stop reason: HOSPADM

## 2023-05-11 RX ORDER — DIPHENHYDRAMINE HCL 50 MG
50 CAPSULE ORAL EVERY 4 HOURS PRN
Status: DISCONTINUED | OUTPATIENT
Start: 2023-05-11 | End: 2023-05-17 | Stop reason: HOSPADM

## 2023-05-11 RX ORDER — PROPRANOLOL HYDROCHLORIDE 10 MG/1
1 TABLET ORAL 3 TIMES DAILY
COMMUNITY
Start: 2023-04-26 | End: 2023-05-17 | Stop reason: HOSPADM

## 2023-05-11 RX ORDER — HYDROXYZINE PAMOATE 50 MG/1
50 CAPSULE ORAL EVERY 6 HOURS PRN
Status: CANCELLED | OUTPATIENT
Start: 2023-05-11

## 2023-05-11 RX ORDER — TRAZODONE HYDROCHLORIDE 50 MG/1
50 TABLET ORAL NIGHTLY PRN
COMMUNITY
Start: 2023-05-05 | End: 2023-05-17 | Stop reason: HOSPADM

## 2023-05-11 RX ORDER — HALOPERIDOL 5 MG/ML
5 INJECTION INTRAMUSCULAR EVERY 4 HOURS PRN
Status: DISCONTINUED | OUTPATIENT
Start: 2023-05-11 | End: 2023-05-17 | Stop reason: HOSPADM

## 2023-05-11 RX ORDER — ACETAMINOPHEN 325 MG/1
650 TABLET ORAL EVERY 4 HOURS PRN
Status: DISCONTINUED | OUTPATIENT
Start: 2023-05-11 | End: 2023-05-17 | Stop reason: HOSPADM

## 2023-05-11 RX ORDER — ALUMINA, MAGNESIA, AND SIMETHICONE 2400; 2400; 240 MG/30ML; MG/30ML; MG/30ML
15 SUSPENSION ORAL EVERY 6 HOURS PRN
Status: DISCONTINUED | OUTPATIENT
Start: 2023-05-11 | End: 2023-05-17 | Stop reason: HOSPADM

## 2023-05-11 RX ORDER — OLANZAPINE 10 MG/1
20 TABLET ORAL NIGHTLY
Status: CANCELLED | OUTPATIENT
Start: 2023-05-11

## 2023-05-11 RX ORDER — LAMOTRIGINE 25 MG/1
1 TABLET ORAL EVERY 12 HOURS SCHEDULED
COMMUNITY
Start: 2023-04-26 | End: 2023-05-17 | Stop reason: HOSPADM

## 2023-05-11 RX ORDER — ALUMINA, MAGNESIA, AND SIMETHICONE 2400; 2400; 240 MG/30ML; MG/30ML; MG/30ML
15 SUSPENSION ORAL EVERY 6 HOURS PRN
Status: CANCELLED | OUTPATIENT
Start: 2023-05-11

## 2023-05-11 RX ORDER — HALOPERIDOL 5 MG/ML
5 INJECTION INTRAMUSCULAR EVERY 4 HOURS PRN
Status: CANCELLED | OUTPATIENT
Start: 2023-05-11

## 2023-05-11 RX ORDER — HYDROXYZINE PAMOATE 50 MG/1
50 CAPSULE ORAL EVERY 6 HOURS PRN
Status: DISCONTINUED | OUTPATIENT
Start: 2023-05-11 | End: 2023-05-17 | Stop reason: HOSPADM

## 2023-05-11 RX ORDER — SODIUM CHLORIDE 0.9 % (FLUSH) 0.9 %
10 SYRINGE (ML) INJECTION AS NEEDED
Status: DISCONTINUED | OUTPATIENT
Start: 2023-05-11 | End: 2023-05-11 | Stop reason: HOSPADM

## 2023-05-11 RX ORDER — NICOTINE 21 MG/24HR
1 PATCH, TRANSDERMAL 24 HOURS TRANSDERMAL DAILY PRN
Status: DISCONTINUED | OUTPATIENT
Start: 2023-05-11 | End: 2023-05-17 | Stop reason: HOSPADM

## 2023-05-11 RX ORDER — OLANZAPINE 10 MG/1
20 TABLET ORAL NIGHTLY
Status: DISCONTINUED | OUTPATIENT
Start: 2023-05-11 | End: 2023-05-13

## 2023-05-11 RX ORDER — TRAZODONE HYDROCHLORIDE 100 MG/1
100 TABLET ORAL NIGHTLY PRN
Status: CANCELLED | OUTPATIENT
Start: 2023-05-11

## 2023-05-11 RX ADMIN — NICOTINE 1 PATCH: 21 PATCH, EXTENDED RELEASE TRANSDERMAL at 13:53

## 2023-05-11 RX ADMIN — OLANZAPINE 20 MG: 10 TABLET, FILM COATED ORAL at 22:24

## 2023-05-11 RX ADMIN — OLANZAPINE 20 MG: 10 TABLET, FILM COATED ORAL at 13:11
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
## 2023-05-11 NOTE — PLAN OF CARE
Goal Outcome Evaluation:  Plan of Care Reviewed With: patient  Patient Agreement with Plan of Care: agrees           (SEE ADMISSION NOTE 05/11/23)

## 2023-05-11 NOTE — SIGNIFICANT NOTE
05/11/23 1038   Behavior WDL   Behavior WDL interactions;motor movement;X   Interactions guarded;responding to internal stimuli;suspicious   Motor Movement notable mannerisms/gestures   Emotion Mood WDL   Emotion/Mood/Affect WDL affect;emotion mood   Affect tearful;blunted   Emotion/Mood euphoric;expansive;anxious   Speech WDL   Speech WDL speech;X   Speech pressured speech;rambling;hyper verbal   Perceptual State WDL   Perceptual State WDL hallucinations;perceptual state;X   Hallucinations other (see comments)  (Unable to determine)   Perceptual State illusions   Thought Process WDL   Thought Process WDL delusions;judgment and insight;thought content;thought process   Delusions religiosity;somatic   Judgment and Insight insight not appropriate to situation;judgment not appropriate to situation   Thought Content suicidal thoughts;somatic concerns;preoccupation   Thought Process flight of ideas   Intellectual Performance WDL   Intellectual Performance WDL intellectual performance;level of consciousness;WDL   Intellectual Performance able to comprehend   Level of Consciousness Alert   Coping/Stress   Major Change/Loss/Stressor legal concerns;chemical dependency/abuse   Patient Personal Strengths self-reliant   Sources of Support unable to assess   Techniques to [place] with Loss/Stress/Change substance use   Reaction to Health Status overwhelmed   C-SSRS (Recent)   Q1 Wished to be Dead (Past Month) no   Q2 Suicidal Thoughts (Past Month) yes   Q3 Suicidal Thought Method no   Q4 Suicidal Intent without Specific Plan no   Q5 Suicide Intent with Specific Plan no   Q6 Suicide Behavior (Lifetime) no   Violence Risk   Feels Like Hurting Others no   Previous Attempt to Harm Others no     SW met with pt at bedside this date at the request of the provider. Pt was initially calm, however, once SW began inquiring about pts concerns pt became agitated and tearful. Pt was throwing her arms around and shouting things that SW could  "not understand. Pt reported that she was \"suicidal\" because of other people and that it was not her fault. Pt was rambling about mental illness and it was not her fault that everyone was mentally ill. Pt had [employer] papers in her hand and she crumbled them. Pt was stating that she wanted all the blood tests to be run on her for HIV, cancer, STDs and every disease. SW was unable to hear all of what pt was stating during her assessment because pt appeared extremely manic with pressured speech. SW updated provider regarding pts presentation during her assessment this date.   "

## 2023-05-12 PROBLEM — F33.2 SEVERE RECURRENT MAJOR DEPRESSION WITHOUT PSYCHOTIC FEATURES: Status: ACTIVE | Noted: 2023-05-12

## 2023-05-12 PROBLEM — F13.10 BENZODIAZEPINE ABUSE: Status: ACTIVE | Noted: 2023-05-12

## 2023-05-12 PROBLEM — Z72.0 TOBACCO ABUSE: Status: ACTIVE | Noted: 2023-05-12

## 2023-05-12 PROBLEM — F12.10 MARIJUANA ABUSE: Status: ACTIVE | Noted: 2023-05-12

## 2023-05-12 PROBLEM — F15.10 AMPHETAMINE ABUSE: Status: ACTIVE | Noted: 2023-05-12

## 2023-05-12 RX ADMIN — HYDROXYZINE PAMOATE 50 MG: 50 CAPSULE ORAL at 05:37

## 2023-05-12 RX ADMIN — OLANZAPINE 20 MG: 10 TABLET, FILM COATED ORAL at 21:28

## 2023-05-12 RX ADMIN — ACETAMINOPHEN 650 MG: 325 TABLET ORAL at 19:43
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
## 2023-05-12 NOTE — PLAN OF CARE
Goal Outcome Evaluation:  Plan of Care Reviewed With: patient  Patient Agreement with Plan of Care: agrees                Pt is able to be roused with verbal stimulation and light touch.  Pt is alert to self and place.  Pt denies SI and HI.  Pt denies a/v/h.  Pt has been withdrawn to bed today.  Pt exhibits no s/s of acute distress.

## 2023-05-12 NOTE — PLAN OF CARE
Goal Outcome Evaluation:  Plan of Care Reviewed With: patient  Patient Agreement with Plan of Care: agrees     Pt has stayed in bed this shift except a couple times of briefly getting up. CWA at bedside. Once up and med compliant after education. The second time, close to the end of shift the pt jumped up and came towards CWA rambling with words. This nurse went in and spoke with the patient and pt disclosed she had a nightmare and requested prazosin. This medication was not ordered, educated given. Pt was able to take the PRN dose of Vistaril. Pt rated anxiety 7, denies SI, HI, AVH, and Depression.

## 2023-05-13 RX ORDER — OLANZAPINE 10 MG/1
10 TABLET ORAL NIGHTLY
Status: DISCONTINUED | OUTPATIENT
Start: 2023-05-13 | End: 2023-05-17 | Stop reason: HOSPADM

## 2023-05-13 RX ADMIN — NICOTINE 1 PATCH: 21 PATCH, EXTENDED RELEASE TRANSDERMAL at 20:56

## 2023-05-13 RX ADMIN — HYDROXYZINE PAMOATE 50 MG: 50 CAPSULE ORAL at 01:15

## 2023-05-13 RX ADMIN — OLANZAPINE 10 MG: 10 TABLET, FILM COATED ORAL at 20:52

## 2023-05-13 NOTE — PLAN OF CARE
Goal Outcome Evaluation:  Plan of Care Reviewed With: patient  Patient Agreement with Plan of Care: agrees   PATIENT ALERT AND ORIENTED AND CALM AND COOPERATIVE WITH STAFF. PATIENT DENIES S/I, H/I AND REPORTS VOICES ARE IMPROVING. PATIENT CONTINUES TO STAY WITHDRAWN TO HER ROOM. NO INAPPROPRIATE OR AGGRESSIVE BEHAVIOR NOTED . WILL CONTINUE TO MONITOR FOR CHANGES IN MOOD OR BEHAVIOR.

## 2023-05-13 NOTE — PLAN OF CARE
Goal Outcome Evaluation:  Plan of Care Reviewed With: patient  Patient Agreement with Plan of Care: agrees   Pt withdrawn to room, avoids social interaction. Polite upon approach. Complained of back pain, medication administered and relieved. Provider notified of BP 96/54 on second attempt, no new orders for night med dose. Pt denies SI, HI, AVH. Rated depression 7 and anxiety 7.

## 2023-05-13 NOTE — PROGRESS NOTES
[facility]     Psychiatric Progress Note    Patient Name: Gerry German  : 1990  MRN: [id number]  Primary Care Physician:  Provider, No Known  Date of admission: 2023    Subjective   Subjective     Patient seen and chart reviewed, discussed with staff.    Chief Complaint: Psychosis, depression, substance abuse      HPI:     Staff reports the patient rated her anxiety and depression as a 7.  Continues to endorse hallucinations but not as severe.  Denies suicidal homicidal ideation.  Noted to be withdrawn to her room.    Patient's morning is in her bed sleeping.  She does arouse for interview but is difficult to engage.  She gives minimal responses.  She continues to mumble and have very poor diction.  Patient complains of being sleepy and appears somewhat tired and a little sedated this morning.  She denies any suicidal or homicidal ideation.  She reports that she did not sleep well but this was not the case according to staff.  Patient reports that she had some nightmares last night.  Asked about being on prazosin.  Given that she already has low blood pressure would not provide at this time.  We will reduce dose of olanzapine.      Objective   Objective     Vitals:   Temp:  [97.5 °F (36.4 °C)-97.9 °F (36.6 °C)] 97.5 °F (36.4 °C)  Heart Rate:  [56-85] 56  Resp:  [16-18] 16  BP: ()/(57-59) 102/59          Mental Status Exam:      Appearance:   Sleeping, arousable, but dozes off frequently  Reliability:   Limited  Eye Contact:   Poor  Concentration/Focus:    Attentive but minimally  Behaviors:    No restlessness or agitation, withdrawal and isolate, drowsy  Memory :    Intact  Speech:    Minimal, mumbling, poor diction  Language:   Appropriate  Mood :    Dysthymic  Affect:    Constricted  Thought process:    Guarded, goal directed  Thought Content:    Denies suicidal or homicidal ideation, reports hallucinations  Insight:   Fair  Judgement:    Intact, no behavioral disturbance      Result  Review    Result Review:  I have personally reviewed the results from the time of this admission to 5/13/2023 12:20 EDT and agree with these findings:  []  Laboratory  []  Microbiology  []  Radiology  []  EKG/Telemetry   []  Cardiology/Vascular   []  Pathology  []  Old records  []  Other:  Most notable findings include:     Lab Results (last 24 hours)     ** No results found for the last 24 hours. **              Medications:   OLANZapine, 10 mg, Oral, Nightly          Assessment / Plan       Active Hospital Problems:  Active Hospital Problems    Diagnosis    • **Psychosis    • Amphetamine abuse    • Benzodiazepine abuse    • Marijuana abuse    • Tobacco abuse    • Severe recurrent major depression without psychotic features        Plan:     • Decreased olanzapine to 10 mg secondary to sedation  • [place] mood stabilization abatement of psychosis referral to drug and alcohol rehabilitation  • Work on mood stabilization and abatement of any suicidal ideation or psychosis.  Work on appropriate safety plan  • Continue supportive therapy  • Patient to engage in all group and individual treatment modalities available on the unit  • Obtain collateral information if possible  • Titrate medications as clinically indicated  • Work on appropriate disposition follow-up including referrals to substance abuse treatment if indicated      Disposition:  I expect patient to be discharged 2 to 3 days.    Part of this note may be an electronic transcription/translation of spoken language to printed text using the Dragon dictation system.         Electronically signed by Hunter Bridges MD, 05/13/23, 12:20 PM EDT.

## 2023-05-14 RX ORDER — BUPRENORPHINE HYDROCHLORIDE AND NALOXONE HYDROCHLORIDE DIHYDRATE 8; 2 MG/1; MG/1
2 TABLET SUBLINGUAL DAILY
Status: DISCONTINUED | OUTPATIENT
Start: 2023-05-14 | End: 2023-05-17 | Stop reason: HOSPADM

## 2023-05-14 RX ORDER — DULOXETIN HYDROCHLORIDE 30 MG/1
30 CAPSULE, DELAYED RELEASE ORAL DAILY
Status: DISCONTINUED | OUTPATIENT
Start: 2023-05-14 | End: 2023-05-17 | Stop reason: HOSPADM

## 2023-05-14 RX ADMIN — ACETAMINOPHEN 650 MG: 325 TABLET ORAL at 09:49

## 2023-05-14 RX ADMIN — DULOXETINE HYDROCHLORIDE 30 MG: 30 CAPSULE, DELAYED RELEASE ORAL at 13:14

## 2023-05-14 RX ADMIN — OLANZAPINE 10 MG: 10 TABLET, FILM COATED ORAL at 20:02

## 2023-05-14 RX ADMIN — NICOTINE 1 PATCH: 21 PATCH, EXTENDED RELEASE TRANSDERMAL at 20:03

## 2023-05-14 RX ADMIN — BUPRENORPHINE AND NALOXONE 2 TABLET: 8; 2 TABLET SUBLINGUAL at 11:05

## 2023-05-14 NOTE — PLAN OF CARE
"Goal Outcome Evaluation:  Plan of Care Reviewed With: patient  Patient Agreement with Plan of Care: agrees      Pt has underling irritability, apologizes to staff for short answers. Pt stated, \"I am withdrawaling from suboxone and they won't give me any. All I want to do is sleep, just let me do that.\" Pt told of friend Martin calling and stating he would possibly visit tomorrow. Pt stated that he didn't have to do that. Friend Martin stated to this nurse that he believes that the pt sleeps to escape daily life and relive the past with her kids in dreams since she doesn't have much to do with the two kids. One is living with her mother and the other with the child's father and no one trusts her with them. Pt denies SI, HI, AVH at this time. Pt stated that her anxiety was a 6, depression was a \"12\"            "

## 2023-05-14 NOTE — PLAN OF CARE
Goal Outcome Evaluation:  Plan of Care Reviewed With: patient  Patient Agreement with Plan of Care: agrees   PATIENT ALERT AND ORIENTED AND COOPERATIVE WITH STAFF. COMPLIANT WITH MEDICATIONS. PATIENT DENIES S/I, H/I OR HALLUCINATIONS AT THIS TIME. PATIENT STARTED ON SUBOXONE TODAY AND MOOD HAS IMPROVED SINCE TAKING IT . PATIENT HAS BEEN OUT OF ROOM MORE TODAY INTERACTING WITH STAFF AND PEERS.

## 2023-05-14 NOTE — PROGRESS NOTES
" [facility]     Psychiatric Progress Note    Patient Name: Gerry German  : 1990  MRN: [id number]  Primary Care Physician:  Provider, No Known  Date of admission: 2023    Subjective   Subjective     Patient seen and chart reviewed, discussed with staff.    Chief Complaint: Psychosis, depression, substance use      HPI:     Patient today is much more alert and awake and engaging in interview than previous days.  Patient also states that she is withdrawing hard from Suboxone.  Patient reports that her last dose was the day prior to coming to the hospital.  Patient reports that usually on day 4-5 the withdrawal becomes almost unbearable.  She reports that she is having back pain and leg pain.  Appears to have some diaphoresis.  Has some agitation and anxiety.  Denies any suicidal or homicidal ideation.  Patient states that she has been taking Suboxone and says, \"it is on my [facility] you can check, I am detoxing and I need it.\"    More alert and awake with reduction of olanzapine.    States that she needed to be started back on medications including duloxetine.    Staff reports she continues to be withdrawn and isolative to her room.  She been compliant with treatment medications.  Reported her anxiety as a 6 and depression as a 10 staff this morning.  He is denying suicidal or homicidal ideation as well as any hallucinations        Objective   Objective     Vitals:   Temp:  [97.5 °F (36.4 °C)-98.3 °F (36.8 °C)] 97.5 °F (36.4 °C)  Heart Rate:  [] 116  Resp:  [16] 16  BP: (108-125)/(84-85) 125/85          Mental Status Exam:      Appearance:   Well-developed, well-nourished, appears in some discomfort due to withdrawal from Suboxone, much more awake and talkative than any previous day  Reliability:   Good  Eye Contact:   Good  Concentration/Focus:    Attentive  Behaviors:    Little restlessness, no agitation  Memory :    Intact  Speech:    Normal rate and volume, spontaneous  Language:   " "Appropriate, relevant  Mood :    \"Better\"  Affect:    Irritable, slightly anxious  Thought process:    Sequential, goal directed, no paranoia or delusions  Thought Content:    Denies suicidal or homicidal ideation, denies hallucinations and none evident  Insight:   Fair  Judgement:    Intact, no behavioral disturbance      Result Review    Result Review:  I have personally reviewed the results from the time of this admission to 5/14/2023 12:57 EDT and agree with these findings:  []  Laboratory  []  Microbiology  []  Radiology  []  EKG/Telemetry   []  Cardiology/Vascular   []  Pathology  []  Old records  []  Other:  Most notable findings include:     Lab Results (last 24 hours)     ** No results found for the last 24 hours. **              Medications:   buprenorphine-naloxone, 2 tablet, Sublingual, Daily  OLANZapine, 10 mg, Oral, Nightly          Assessment / Plan       Active Hospital Problems:  Active Hospital Problems    Diagnosis    • **Psychosis    • Amphetamine abuse    • Benzodiazepine abuse    • Marijuana abuse    • Tobacco abuse    • Severe recurrent major depression without psychotic features        Plan:     • Continue low-dose olanzapine  • Add duloxetine for depression as patient reports that she has been on it in came into the hospital to get back on her antidepressants.  • Patient has been getting Suboxone 16 mg daily and got a day prior to admission and will continue therapy and start medication.  • Work on mood stabilization and abatement of any suicidal ideation or psychosis.  Work on appropriate safety plan  • Continue supportive therapy  • Patient to engage in all group and individual treatment modalities available on the unit  • Obtain collateral information if possible  • Titrate medications as clinically indicated  • Work on appropriate disposition follow-up including referrals to substance abuse treatment if indicated      Disposition:  I expect patient to be discharged 1 to 2 days.    Part " of this note may be an electronic transcription/translation of spoken language to printed text using the Dragon dictation system.         Electronically signed by Hunter Bridges MD, 05/14/23, 12:57 PM EDT.

## 2023-05-15 RX ADMIN — BUPRENORPHINE AND NALOXONE 2 TABLET: 8; 2 TABLET SUBLINGUAL at 09:51

## 2023-05-15 RX ADMIN — MAGNESIUM HYDROXIDE 10 ML: 2400 SUSPENSION ORAL at 21:41

## 2023-05-15 RX ADMIN — OLANZAPINE 10 MG: 10 TABLET, FILM COATED ORAL at 21:01

## 2023-05-15 RX ADMIN — NICOTINE 1 PATCH: 21 PATCH, EXTENDED RELEASE TRANSDERMAL at 21:01

## 2023-05-15 RX ADMIN — DULOXETINE HYDROCHLORIDE 30 MG: 30 CAPSULE, DELAYED RELEASE ORAL at 09:51

## 2023-05-15 NOTE — PROGRESS NOTES
[facility]     Psychiatric Progress Note    Patient Name: Gerry German  : 1990  MRN: [id number]  Primary Care Physician:  Provider, No Known  Date of admission: 2023    Subjective   Subjective     Patient seen and chart reviewed, discussed with staff.    Chief Complaint: Psychosis, depression, substance use      HPI:     Staff reports the patient has had a better mood and has been up and more engaging and interactive.  Continues to report hallucinations.  She is denying suicidal or homicidal ideation.  Was noted to slept well last night.    Patient today reports that she feels decent and is doing better.  Reports that she still has voices but they are far back in her head and not interfering with her as much.  She reports that she knows it is her and describes possib internal dialogue, but also states that these are that he sees believe are being said that she really hears..  Patient reports that she is doing well on the olanzapine and it has helped reduce the voices.    Discussed her depression and anxiety she reports she does not want to be on any more medication.  Reports that this medication seems to be doing well.  Would prefer to stick with her medications.  She denying suicidal or homicidal ideation.  She is somewhat irritable during exam.  Scribes her mood is manic but she is lying in bed comfortably and resting in her room.  She is also noted to be isolative and withdrawn to her room and resting quietly since she has been here in the hospital does not appear to have any acute manic symptoms and actually is quite the opposite and somewhat subdued.        Objective   Objective     Vitals:   Temp:  [98 °F (36.7 °C)-98.1 °F (36.7 °C)] 98.1 °F (36.7 °C)  Heart Rate:  [] 105  Resp:  [18] 18  BP: ()/(49-65) 101/65          Mental Status Exam:      Appearance:   Well-developed, well-nourished, alert, awake, participates in exam  Reliability:   Good  Eye Contact:    "Good  Concentration/Focus:    Attentive  Behaviors:    No behavioral disturbance, slight irritability but does not impede interview  Memory :    Intact  Speech:    Normal rate and volume, spontaneous  Language:   Appropriate, relevant  Mood :    \"Manic\"  Affect:    Irritable  Thought process:    Sequential, goal directed, no delusions or paranoia  Thought Content:    Denies suicidal or homicidal ideation, reports hallucinations and does not appear to be responding to internal stimuli  Insight:   Fair, improving  Judgement:    Intact, no behavioral*      Result Review    Result Review:  I have personally reviewed the results from the time of this admission to 5/15/2023 11:37 EDT and agree with these findings:  []  Laboratory  []  Microbiology  []  Radiology  []  EKG/Telemetry   []  Cardiology/Vascular   []  Pathology  []  Old records  []  Other:  Most notable findings include:     Lab Results (last 24 hours)     ** No results found for the last 24 hours. **              Medications:   buprenorphine-naloxone, 2 tablet, Sublingual, Daily  DULoxetine, 30 mg, Oral, Daily  OLANZapine, 10 mg, Oral, Nightly          Assessment / Plan       Active Hospital Problems:  Active Hospital Problems    Diagnosis    • **Psychosis    • Amphetamine abuse    • Benzodiazepine abuse    • Marijuana abuse    • Tobacco abuse    • Severe recurrent major depression without psychotic features        Plan:     • Continue current treatment protocol and titrate medications as clinically indicated  • Work on referral back to drug and alcohol rehabilitation  • Work on mood stabilization and abatement of any suicidal ideation or psychosis.  Work on appropriate safety plan  • Continue supportive therapy  • Patient to engage in all group and individual treatment modalities available on the unit  • Obtain collateral information if possible  • Titrate medications as clinically indicated  • Work on appropriate disposition follow-up including referrals to " substance abuse treatment if indicated      Disposition:  I expect patient to be discharged 1 to 2 days.    Part of this note may be an electronic transcription/translation of spoken language to printed text using the Dragon dictation system.         Electronically signed by Hunter Bridges MD, 05/15/23, 11:37 AM EDT.

## 2023-05-15 NOTE — PLAN OF CARE
"Goal Outcome Evaluation:  Plan of Care Reviewed With: patient  Patient Agreement with Plan of Care: agrees     Progress: no change          Patient rating anxiety 7/10 and depression 4/10. Denies HI/SI. When asked about AVH, patient states \"they are so far back I cant tell.\" BP 93/57 with HR 69 at 1920. Rechecked at 2015, BP 99/49 and . MD notified. Patient encouraged to drink fluids. BP rechecked after patient had been sitting up in dayroom, drinking fluids and eating a snack. At 2045, /52 and HR 91. Patient asymptomatic. No compliant of dizziness or confusion. Patient states her \"blood pressure runs low.\" Patient has been frequently walking up between dayroom and patient room. Interacts appropriately with peers. Able to make needs known. Will continue plan of care and provide a safe patient environment.   "

## 2023-05-15 NOTE — PLAN OF CARE
"Goal Outcome Evaluation:  Plan of Care Reviewed With: patient  Patient Agreement with Plan of Care: agrees         Pt alert and oriented.  Denies SI and HI.  Reports hearing voices, \"in the backround.\"  Pt compliant with meds.  Pt withdrawn to room.  No s/s of distress at this time.         "

## 2023-05-16 PROBLEM — K59.00 CONSTIPATION: Status: ACTIVE | Noted: 2023-05-16

## 2023-05-16 RX ORDER — POLYETHYLENE GLYCOL 3350 17 G/17G
17 POWDER, FOR SOLUTION ORAL DAILY
Status: DISCONTINUED | OUTPATIENT
Start: 2023-05-16 | End: 2023-05-17 | Stop reason: HOSPADM

## 2023-05-16 RX ADMIN — BUPRENORPHINE AND NALOXONE 2 TABLET: 8; 2 TABLET SUBLINGUAL at 09:38

## 2023-05-16 RX ADMIN — POLYETHYLENE GLYCOL 3350 17 G: 17 POWDER, FOR SOLUTION ORAL at 13:11

## 2023-05-16 RX ADMIN — OLANZAPINE 10 MG: 10 TABLET, FILM COATED ORAL at 20:29

## 2023-05-16 RX ADMIN — DULOXETINE HYDROCHLORIDE 30 MG: 30 CAPSULE, DELAYED RELEASE ORAL at 09:36

## 2023-05-16 RX ADMIN — TRAZODONE HYDROCHLORIDE 100 MG: 50 TABLET ORAL at 20:31

## 2023-05-16 NOTE — PLAN OF CARE
Goal Outcome Evaluation:  Plan of Care Reviewed With: patient  Patient Agreement with Plan of Care: agrees     Progress: no change          Patient rating anxiety 4/10 and depression 6/10. Denies HI/SI. Endorses AVH but states she is unable to understand them. Has been withdrawn to room this shift but did come out to receive a snack. Able to make needs known. Will continue plan of care and provide a safe patient environment.

## 2023-05-16 NOTE — PLAN OF CARE
Goal Outcome Evaluation:  Plan of Care Reviewed With: patient  Patient Agreement with Plan of Care: agrees                Pt alert and oriented.  Pt withdrawn to room.  Pt denies SI and HI.  Pt cooperative with meds.  No s/s of acute distress at this time

## 2023-05-16 NOTE — PROGRESS NOTES
" [facility]     Psychiatric Progress Note    Patient Name: Gerry German  : 1990  MRN: [id number]  Primary Care Physician:  Provider, No Known  Date of admission: 2023    Subjective   Subjective     Patient seen and chart reviewed, discussed with staff.    Chief Complaint: Psychosis, depression, substance use      HPI:     Staff reports the patient describes her anxiety as a 4 or depression as a 6.  She been denying suicidal or homicidal ideation.  Continues to report hallucinations but states they do not bother her.  She slept well last night.    She complained of constipation last night was given Milk of Magnesia and it was not productive.  We will give MiraLAX.    Patient reports that she is feeling better.  States that she is \"pretty good\" today.  Continues to endorse hallucinations but states they continue to go farther back in her mind and not a problem.  She reports that she slept well last night.  Reports she has a good appetite.      Objective   Objective     Vitals:   Temp:  [98.1 °F (36.7 °C)] 98.1 °F (36.7 °C)  Heart Rate:  [87] 87  Resp:  [18] 18  BP: (104)/(58) 104/58          Mental Status Exam:      Appearance:   Well-developed, well-nourished, calm, cooperative, engages in interview  Reliability:   Good  Eye Contact:   Good  Concentration/Focus:    Attentive to the interview  Behaviors:    No restlessness or agitation  Memory :    Sensorium intact, no deficits  Speech:    Normal rate and volume, spontaneous  Language:   Appropriate, relevant  Mood :    \"Pretty good\"  Affect:    Constricted  Thought process:    Goal directed, linear, no thought disorganization, no paranoia  Thought Content:    Denies suicidal or homicidal ideation, endorses hallucinations but diminishing, no evidence of responding to internal stimuli  Insight:   Fair, improving  Judgement:    Intact, no behavioral disturbance      Result Review    Result Review:  I have personally reviewed the results from the " time of this admission to 5/16/2023 10:46 EDT and agree with these findings:  []  Laboratory  []  Microbiology  []  Radiology  []  EKG/Telemetry   []  Cardiology/Vascular   []  Pathology  []  Old records  []  Other:  Most notable findings include:     Lab Results (last 24 hours)     ** No results found for the last 24 hours. **              Medications:   buprenorphine-naloxone, 2 tablet, Sublingual, Daily  DULoxetine, 30 mg, Oral, Daily  OLANZapine, 10 mg, Oral, Nightly          Assessment / Plan       Active Hospital Problems:  Active Hospital Problems    Diagnosis    • **Psychosis    • Amphetamine abuse    • Benzodiazepine abuse    • Marijuana abuse    • Tobacco abuse    • Severe recurrent major depression without psychotic features        Plan:     • Failed milk of mag and is still constipated and will give MiraLAX  • Continue other meds as ordered and titrate as clinically indicated  • Patient reports that she is feeling better needs to call her Suboxone clinic today to make sure she can get on the schedule for tomorrow to continue medication is hopeful to discharge tomorrow  • Work on mood stabilization and abatement of any suicidal ideation or psychosis.  Work on appropriate safety plan  • Continue supportive therapy  • Patient to engage in all group and individual treatment modalities available on the unit  • Obtain collateral information if possible  • Titrate medications as clinically indicated  • Work on appropriate disposition follow-up including referrals to substance abuse treatment if indicated      Disposition:  I expect patient to be discharged 1 to 2 days.    Part of this note may be an electronic transcription/translation of spoken language to printed text using the Dragon dictation system.         Electronically signed by Hunter Bridges MD, 05/16/23, 10:46 AM EDT.

## 2023-05-16 NOTE — CASE MANAGEMENT/SOCIAL WORK
Collateral with pt family friend. He shares patient has been living with him and is aware of pt substance abuse problem. He agrees patient would benefit from substance abuse treatment, he shares pt can return to his home if decides not to discharge to substance abuse treatment.

## 2023-05-16 NOTE — SIGNIFICANT NOTE
05/16/23 1523   Plan   Plan Patient reports once discharged she will self admit to the [facility]. Patient does not agree to substance abuse treatment.   Patient/Family in Agreement with Plan yes   Final Discharge Disposition Code 30 - still a patient

## 2023-05-17 VITALS
DIASTOLIC BLOOD PRESSURE: 55 MMHG | OXYGEN SATURATION: 98 % | HEART RATE: 60 BPM | WEIGHT: 138.67 LBS | SYSTOLIC BLOOD PRESSURE: 97 MMHG | HEIGHT: 64 IN | RESPIRATION RATE: 16 BRPM | BODY MASS INDEX: 23.67 KG/M2 | TEMPERATURE: 98 F

## 2023-05-17 PROBLEM — F29 PSYCHOSIS: Status: RESOLVED | Noted: 2023-05-11 | Resolved: 2023-05-17

## 2023-05-17 RX ORDER — OLANZAPINE 10 MG/1
10 TABLET ORAL NIGHTLY
Qty: 30 TABLET | Refills: 1 | Status: SHIPPED | OUTPATIENT
Start: 2023-05-17

## 2023-05-17 RX ORDER — LACTULOSE 10 G/15ML
30 SOLUTION ORAL
Status: DISCONTINUED | OUTPATIENT
Start: 2023-05-17 | End: 2023-05-17 | Stop reason: HOSPADM

## 2023-05-17 RX ORDER — LACTULOSE 10 G/15ML
30 SOLUTION ORAL
Qty: 135 ML | Refills: 0 | Status: SHIPPED | OUTPATIENT
Start: 2023-05-17 | End: 2023-05-18

## 2023-05-17 RX ORDER — DOCUSATE SODIUM 100 MG/1
100 CAPSULE, LIQUID FILLED ORAL 2 TIMES DAILY
Qty: 60 CAPSULE | Refills: 1 | Status: SHIPPED | OUTPATIENT
Start: 2023-05-17

## 2023-05-17 RX ORDER — DULOXETIN HYDROCHLORIDE 30 MG/1
30 CAPSULE, DELAYED RELEASE ORAL DAILY
Qty: 30 CAPSULE | Refills: 1 | Status: SHIPPED | OUTPATIENT
Start: 2023-05-17

## 2023-05-17 RX ORDER — DOCUSATE SODIUM 100 MG/1
100 CAPSULE, LIQUID FILLED ORAL 2 TIMES DAILY
Status: DISCONTINUED | OUTPATIENT
Start: 2023-05-17 | End: 2023-05-17 | Stop reason: HOSPADM

## 2023-05-17 RX ADMIN — BUPRENORPHINE AND NALOXONE 2 TABLET: 8; 2 TABLET SUBLINGUAL at 09:08

## 2023-05-17 RX ADMIN — DULOXETINE HYDROCHLORIDE 30 MG: 30 CAPSULE, DELAYED RELEASE ORAL at 09:08

## 2023-05-17 NOTE — DISCHARGE SUMMARY
[facility]         DISCHARGE SUMMARY    Patient Name: Gerry German  : 1990  MRN: [id number]    Date of Admission: 2023  Date of Discharge: 2023  Primary Care Physician: Provider, No Known    Consults     No orders found from 2023 to 2023.          Presenting Problem:   Psychosis [F29]    Active and Resolved Hospital Problems:  Active Hospital Problems    Diagnosis POA   • Constipation [K59.00] Yes   • Amphetamine abuse [F15.10] Yes   • Benzodiazepine abuse [F13.10] Yes   • Marijuana abuse [F12.10] Yes   • Tobacco abuse [Z72.0] Yes   • Severe recurrent major depression without psychotic features [F33.2] Yes      Resolved Hospital Problems    Diagnosis POA   • **Psychosis [F29] Yes         Hospital Course     Hospital Course:  Gerry German is a 32 y.o. female admitted on a voluntary basis for bizarre behavior and psychosis.  Toxicology screen was positive for amphetamines and benzodiazepines.  She has a very long history of substance use.    Initially patient was very difficult to engage.  She was disorganized in her thoughts.  She also was crashing from methamphetamine and was very tired, and she had benzodiazepines in her system contributing to her tiredness.  Patient made very bizarre and disorganized early in her hospital stay but does cleared once she detoxed from substances.    Sensorium cleared and psychosis abated after being started on olanzapine.  She was initially started on 20 mg.  She continued to to be sleepy, the dose was decreased to 10 mg.  The patient improved on the lower dose of medication and was much more arousable and awake throughout today.  Her mood and affect improved over the course of her hospital stay.    Patient reported being on Suboxone prior to coming into the hospital.  Garett report shows that she had been on Suboxone up until the day of, or day prior to admission.  On day 2 or 3 of hospitalization she began complaining of  "withdrawal from Suboxone.  She had body muscle aches, joint pain, was feeling very uneasy and anxious, having nausea and vomiting.  She reports she does have regular follow-up with Suboxone clinic.  She was started back on Suboxone at her previous dose of 2 sublingual strips daily.  Withdrawal symptoms subsided with the reinitiation of Suboxone.    She previously been on duloxetine which she reported was effective for depression and this was continued.    With continuation of duloxetine as well as Suboxone the patient complained of constipation.  Milk of Magnesia was not effective.  She was given MiraLAX, and again this was not effective in treating her constipation.  We will prescribe lactulose for the constipation, and docusate 100 mg twice daily to avoid this problem in the future once her acute constipation resolved.    Patient, cooperative.  She reports she is feeling much better.  She been free of suicidal ideations.  Her mood and affect slowly improved over the course of her hospital stay.  She has plans to leave the hospital today, go back home and begin treatment with her outpatient Suboxone treatment.  Her sleep has been good.  She reports her mood is good.  She reports some hallucinations but she cannot make out what they are saying in her background.  She does not appear to be actively hallucinating.    On day of discharge she is calm, cooperative, engaging.  She is awake and alert, fully oriented, and she is future oriented and goal directed.  Speech is spontaneous, articulate, fluent, normal rate and volume.  Language is appropriate relevant.  Mood is described as \"about a 6\" and she has an appropriate if not somewhat slightly constricted affect.  Thought processes are linear and goal directed.  Thought content is negative for suicidal or homicidal ideation.  Insight and judgment are improved.  Patient requesting discharge today this point does not need inpatient hospital care and can be managed as an " outpatient will discharge home        DISCHARGE Follow Up Recommendations for labs and diagnostics: Primary care for routine health, substance abuse treatment, [facility] mental health      Day of Discharge     Vital Signs:  Temp:  [98 °F (36.7 °C)-98.6 °F (37 °C)] 98 °F (36.7 °C)  Heart Rate:  [60-77] 60  Resp:  [16-18] 16  BP: ()/(55-70) 97/55      Pertinent  and/or Most Recent Results     LAB RESULTS:      Lab 05/11/23  0629   WBC 7.32   HEMOGLOBIN 13.9   HEMATOCRIT 41.1   PLATELETS 371   NEUTROS ABS 3.63   IMMATURE GRANS (ABS) 0.01   LYMPHS ABS 2.43   MONOS ABS 0.57   EOS ABS 0.65*   MCV 89.7         Lab 05/11/23  0629   SODIUM 137   POTASSIUM 3.1*   CHLORIDE 96*   CO2 28.5   ANION GAP 12.5   BUN 17   CREATININE 0.93   EGFR 83.9   GLUCOSE 124*   CALCIUM 9.4         Lab 05/11/23  0629   TOTAL PROTEIN 7.5   ALBUMIN 4.3   GLOBULIN 3.2   ALT (SGPT) 65*   AST (SGOT) 57*   BILIRUBIN 0.5   ALK PHOS 86                                     Lab 05/11/23  0629   ETHANOL PCT <0.010   ETHANOL MGDL <10         Lab 05/11/23  0838   AMPH/METHAM SCREEN, URINE Positive*   BENZODIAZEPINE SCREEN, URINE Positive*   COCAINE SCREEN, URINE Negative   OPIATES Negative   THC URINE SCREEN Positive*   METHADONE SCREEN, URINE Negative     Brief Urine Lab Results  (Last result in the past 365 days)      Color   Clarity   Blood   Leuk Est   Nitrite   Protein   CREAT   Urine HCG        05/11/23 0838 Dark Yellow   Clear   Negative   Small (1+)   Negative   Negative                    CT Abdomen Pelvis Without Contrast    Result Date: 5/3/2023  Impression: Impression: 1. Marked fecal stasis, but with no evidence of inflammation or other associated abnormality except for redundant descending colon. 2. Significantly distended stomach, perhaps due to a large recent meal. Delayed gastric emptying is a consideration. No evidence of small bowel dilatation or obstruction. 3. No evidence of urolithiasis, obstructive uropathy, or perinephric  inflammatory change. No other evidence of acute intra-abdominal or intrapelvic disease. Electronically Signed: Zeke Patel  5/3/2023 10:41 PM EDT  Workstation ID: [id number]    XR Chest 1 View    Result Date: 5/3/2023  Impression: No acute cardiopulmonary abnormality. Electronically Signed: Paz Carlton  5/3/2023 8:24 PM EDT  Workstation ID: [id number]                  Imaging Results (Last 7 Days)     ** No results found for the last 168 hours. **           Labs Pending at Discharge:           Discharge Details        Discharge Medications      New Medications      Instructions Start Date   docusate sodium 100 MG capsule   100 mg, Oral, 2 Times Daily      lactulose 10 GM/15ML solution  Commonly known as: CHRONULAC   30 g, Oral, Every 3 Hours, If first or second dose effective in producing bowel movement then stop the medication      OLANZapine 10 MG tablet  Commonly known as: zyPREXA   10 mg, Oral, Nightly         Continue These Medications      Instructions Start Date   buprenorphine-naloxone 8-2 MG film film  Commonly known as: SUBOXONE   2 films, Sublingual, Daily      DULoxetine 30 MG capsule  Commonly known as: CYMBALTA   30 mg, Oral, Daily         Stop These Medications    cyclobenzaprine 10 MG tablet  Commonly known as: FLEXERIL     lamoTRIgine 25 MG tablet  Commonly known as: LaMICtal     promethazine 25 MG tablet  Commonly known as: PHENERGAN     propranolol 10 MG tablet  Commonly known as: INDERAL     traZODone 50 MG tablet  Commonly known as: DESYREL            No Known Allergies      Discharge Disposition:  Home or Self Care    Diet:  Hospital:  Diet Order   Procedures   • Diet: Regular/House Diet; Texture: Regular Texture (IDDSI 7); Fluid Consistency: Thin (IDDSI 0)         Discharge Activity:   Activity Instructions     Activity as Tolerated            Discharge Condition: Stable    CODE STATUS:  Code Status and Medical Interventions:   Ordered at: 05/11/23 1255     Code Status (Patient has no pulse and is  not breathing):    CPR (Attempt to Resuscitate)     Medical Interventions (Patient has pulse or is breathing):    Full Support         No future appointments.    Additional Instructions for the Follow-ups that You Need to Schedule     Discharge Follow-up with PCP   As directed       Currently Documented PCP:    Provider, No Known    PCP Phone Number:    None     Follow Up Details: As needed         Discharge Follow-up with Specified Provider: [facility]   As directed      To: [facility]         Discharge Follow-up with Specified Provider: [facility] for substance treatment   As directed      To: [facility] for substance treatment               Time spent on Discharge including face to face service: 30 minutes    Part of this note may be an electronic transcription/translation of spoken language to printed text using the Dragon dictation system.        Electronically signed by Hunter Bridges MD, 05/17/23, 10:37 AM EDT.

## 2023-05-17 NOTE — SIGNIFICANT NOTE
05/17/23 1003   Plan   Plan Patient will discharge to family friends home. Patient requested notification of hospitalization sent to [facility] and [place]. Hospitalization letter faxed to officer Ryan at [phone]   Patient/Family in Agreement with Plan yes  (Discussed discharge plan with patient, patient agrees to outpatient with [facility]. Patient family friend also aware and agrees with pt discharge plan.)   Final Discharge Disposition Code 01 - home or self-care   Final Note Patient will discharge to home of family friend. She will follow up with [facility] 5-18-23 9:30 am.

## 2023-05-17 NOTE — PLAN OF CARE
Goal Outcome Evaluation:  Plan of Care Reviewed With: patient  Patient Agreement with Plan of Care: agrees   PATIENT HAS REACHED ALL GOALS AND WILL BE DISCHARGED FROM UNIT. PATIENT TO CONTINUE TREATMENT ON OUTPATIENT BASIS.

## 2023-05-17 NOTE — PLAN OF CARE
Goal Outcome Evaluation:  Plan of Care Reviewed With: patient  Patient Agreement with Plan of Care: agrees      Pt up to dayroom, mood stable. Pt smiles appropriately. Pt on phone this evening with a male that she stated can't come get her tomorrow but will be able to the day after. Pt made it known that she wants to stay one more day. Pt denies SI, HI, AVH, Depression and Anxiety. Med compliant.